# Patient Record
Sex: MALE | Race: WHITE | NOT HISPANIC OR LATINO | Employment: OTHER | URBAN - METROPOLITAN AREA
[De-identification: names, ages, dates, MRNs, and addresses within clinical notes are randomized per-mention and may not be internally consistent; named-entity substitution may affect disease eponyms.]

---

## 2017-10-28 LAB — HBA1C MFR BLD HPLC: 4.9 %

## 2018-07-11 DIAGNOSIS — E03.8 HYPOTHYROIDISM DUE TO HASHIMOTO'S THYROIDITIS: Primary | ICD-10-CM

## 2018-07-11 DIAGNOSIS — E06.3 HYPOTHYROIDISM DUE TO HASHIMOTO'S THYROIDITIS: Primary | ICD-10-CM

## 2018-08-11 LAB — HBA1C MFR BLD HPLC: 5 %

## 2018-08-13 LAB
T4 FREE SERPL-MCNC: 0.92 NG/DL (ref 0.82–1.77)
TSH SERPL DL<=0.005 MIU/L-ACNC: 1.36 UIU/ML (ref 0.45–4.5)

## 2018-08-20 ENCOUNTER — OFFICE VISIT (OUTPATIENT)
Dept: ENDOCRINOLOGY | Facility: HOSPITAL | Age: 33
End: 2018-08-20
Payer: COMMERCIAL

## 2018-08-20 VITALS
WEIGHT: 152.6 LBS | BODY MASS INDEX: 28.08 KG/M2 | DIASTOLIC BLOOD PRESSURE: 86 MMHG | HEART RATE: 76 BPM | HEIGHT: 62 IN | SYSTOLIC BLOOD PRESSURE: 122 MMHG

## 2018-08-20 DIAGNOSIS — E06.3 HYPOTHYROIDISM DUE TO HASHIMOTO'S THYROIDITIS: Primary | ICD-10-CM

## 2018-08-20 DIAGNOSIS — E03.8 HYPOTHYROIDISM DUE TO HASHIMOTO'S THYROIDITIS: Primary | ICD-10-CM

## 2018-08-20 PROCEDURE — 99203 OFFICE O/P NEW LOW 30 MIN: CPT | Performed by: INTERNAL MEDICINE

## 2018-08-20 RX ORDER — DIPHENHYDRAMINE HCL 25 MG
25 CAPSULE ORAL EVERY 12 HOURS PRN
COMMUNITY
End: 2019-08-28 | Stop reason: ALTCHOICE

## 2018-08-20 RX ORDER — QUETIAPINE FUMARATE 50 MG/1
50 TABLET, FILM COATED ORAL
COMMUNITY

## 2018-08-20 RX ORDER — LEVOTHYROXINE SODIUM 0.05 MG/1
50 TABLET ORAL DAILY
COMMUNITY
End: 2019-02-26 | Stop reason: SDUPTHER

## 2018-08-20 RX ORDER — AMLODIPINE BESYLATE 5 MG/1
5 TABLET ORAL DAILY
COMMUNITY

## 2018-08-20 RX ORDER — OXCARBAZEPINE 600 MG/1
600 TABLET, FILM COATED ORAL EVERY 12 HOURS SCHEDULED
COMMUNITY

## 2018-08-20 RX ORDER — CLINDAMYCIN PHOSPHATE 10 MG/G
GEL TOPICAL 2 TIMES DAILY
COMMUNITY
End: 2019-08-28 | Stop reason: ALTCHOICE

## 2018-08-20 RX ORDER — CHLORAL HYDRATE 500 MG
1000 CAPSULE ORAL DAILY
COMMUNITY

## 2018-08-20 RX ORDER — QUETIAPINE FUMARATE 100 MG/1
100 TABLET, FILM COATED ORAL 2 TIMES DAILY
COMMUNITY

## 2018-08-20 NOTE — PROGRESS NOTES
8/20/2018    Assessment/Plan      Diagnoses and all orders for this visit:    Hypothyroidism due to Hashimoto's thyroiditis  -     TSH, 3rd generation Lab Collect; Future  -     T4, free Lab Collect; Future    Other orders  -     amLODIPine (NORVASC) 5 mg tablet; Take 5 mg by mouth daily  -     Omega-3 Fatty Acids (FISH OIL) 1,000 mg; Take 1,000 mg by mouth daily  -     Discontinue: Multiple Vitamin (MULTI-VITAMIN DAILY PO); Take by mouth  -     clindamycin (CLINDAGEL) 1 % gel; Apply topically 2 (two) times a day prn   -     diphenhydrAMINE (BENADRYL) 25 mg capsule; Take 25 mg by mouth every 12 (twelve) hours as needed for itching    -     OXcarbazepine (TRILEPTAL) 600 mg tablet; Take 600 mg by mouth every 12 (twelve) hours    -     QUEtiapine (SEROquel) 100 mg tablet; Take 100 mg by mouth 2 (two) times a day At 2 pm and 8 pm   -     QUEtiapine (SEROquel) 50 mg tablet; Take 50 mg by mouth daily in the early morning    -     Pediatric Multivit-Minerals-C (FLINTSTONES COMPLETE PO); Take 1 each by mouth daily  -     levothyroxine 50 mcg tablet; Take 50 mcg by mouth daily        Assessment/Plan:  Hypothyroidism due to Hashimoto's thyroiditis  His most recent thyroid function tests are normal  He will continue the same levothyroxine 50 mcg daily  Given his history of fluctuation in thyroid levels, I will see him in 6 months  I have asked him to follow up in 6 months with preceding TSH and free T4       CC: thyroid consult    History of Present Illness     HPI: Julio C Carcamo is a 29y o  year old male with history of hypothyroidism due to Hashimoto's thyroiditis  In 2017, he had had a lot of thyroid blood work fluctuations requiring dosage adjustments frequently  His mother brought him to an endocrinologist for evaluation and he was found to have Hashimoto's thyroiditis as the cause of his hypothyroidism  Levothyroxine dosage was set at 50 mcg daily and has been there ever since    Can't he is not the best historian and answers yes to every question so his history is obtained primarily via notes and the group a home aide with him  There is no obvious heat or cold intolerance  There is no obvious palpitation  There is no diarrhea or constipation  There is no tremors, anxiety, depression, insomnia, or fatigue  He will sometimes take afternoon naps  His behaviors are pretty stable for him although he can get more rich at date Program   There is no dry skin or brittle nails  He has a history of alopecia areata and his hair has not grown back in  He has no difficulties with swallowing and appetite is good  Weight has been relatively stable  Review of Systems   Constitutional: Negative for appetite change, fatigue and unexpected weight change  Some afternoon naps  HENT: Positive for hearing loss  Negative for trouble swallowing  Has tympanic tubes  Eyes: Negative for visual disturbance  Respiratory: Negative for chest tightness and shortness of breath  Cardiovascular: Negative for chest pain, palpitations and leg swelling  Gastrointestinal: Negative for abdominal pain, constipation, diarrhea and nausea  Endocrine: Negative for cold intolerance and heat intolerance  Musculoskeletal: Negative for arthralgias and back pain  Skin: Negative for wound  No hair growing in  No dry skin, brittle nails  Neurological: Negative for dizziness, tremors, light-headedness and headaches  Psychiatric/Behavioral: Negative for dysphoric mood and sleep disturbance  The patient is not nervous/anxious  Can have mood swings at day program but mostly good behavior  Goes home on weekends  Lives in a group home         Historical Information   Past Medical History:   Diagnosis Date    Alopecia areata totalis     [facility]     Down syndrome     Heart murmur     Hypertriglyceridemia     Mood disorder ([facility])     Pneumothorax     as a child   Yovany Roles Raynaud's phenomenon     Sensorineural hearing loss     VSD (ventricular septal defect)      Past Surgical History:   Procedure Laterality Date    CRICOID SPLIT      MYRINGOTOMY W/ TUBES Bilateral     TONSILLECTOMY       Social History   History   Alcohol Use No     History   Drug Use No     History   Smoking Status    Never Smoker   Smokeless Tobacco    Never Used     Family History:   Family History   Problem Relation Age of Onset    Sleep apnea Mother     Heart attack Mother     Hyperlipidemia Mother     Raynaud syndrome Mother     No Known Problems Father     No Known Problems Sister     No Known Problems Brother     Diabetes type II Family     Parkinsonism Family        Meds/Allergies   Current Outpatient Prescriptions   Medication Sig Dispense Refill    amLODIPine (NORVASC) 5 mg tablet Take 5 mg by mouth daily      clindamycin (CLINDAGEL) 1 % gel Apply topically 2 (two) times a day prn       diphenhydrAMINE (BENADRYL) 25 mg capsule Take 25 mg by mouth every 12 (twelve) hours as needed for itching        levothyroxine 50 mcg tablet Take 50 mcg by mouth daily      Omega-3 Fatty Acids (FISH OIL) 1,000 mg Take 1,000 mg by mouth daily      OXcarbazepine (TRILEPTAL) 600 mg tablet Take 600 mg by mouth every 12 (twelve) hours        Pediatric Multivit-Minerals-C (FLINTSTONES COMPLETE PO) Take 1 each by mouth daily      QUEtiapine (SEROquel) 100 mg tablet Take 100 mg by mouth 2 (two) times a day At 2 pm and 8 pm       QUEtiapine (SEROquel) 50 mg tablet Take 50 mg by mouth daily in the early morning         No current facility-administered medications for this visit  Allergies   Allergen Reactions    Debrox [Carbamide Peroxide]        Objective   Vitals: Blood pressure 122/86, pulse 76, height 5' 2" (1 575 m), weight 69 2 kg (152 lb 9 6 oz)    Invasive Devices          No matching active lines, drains, or airways          Physical Exam   Constitutional: He is oriented to person, place, and time  He appears well-developed and well-nourished  HENT:   Head: Normocephalic and atraumatic  Eyes: Conjunctivae and EOM are normal  Pupils are equal, round, and reactive to light  No obvious lid lag, stare, proptosis, or periorbital edema  Neck: Normal range of motion  Neck supple  No thyromegaly present  No thyroid nodules palpable  No bruits over the carotids or thyroid gland  Cardiovascular: Normal rate, regular rhythm, normal heart sounds and intact distal pulses  No murmur heard  Pulmonary/Chest: Effort normal and breath sounds normal  He has no wheezes  Abdominal: Soft  Bowel sounds are normal  There is no tenderness  Musculoskeletal: Normal range of motion  He exhibits no edema or deformity  No tremor of the outstretched hands  No spinous process tenderness  No CVA tenderness  Lymphadenopathy:     He has no cervical adenopathy  Neurological: He is alert and oriented to person, place, and time  He has normal reflexes  Skin: Skin is warm and dry  No rash noted  No erythema  Vitals reviewed  The history was obtained from the review of the chart and from the patient and group home aide  Lab Results: Additional blood work done on 08/11/2018 at [facility] did show a CBC that was normal   CMP showed a glucose of 94 fasting but was otherwise normal   Hemoglobin A1c was 5%  Total cholesterol 219, triglycerides 371, HDL 47, and LDL 98      Lab Results   Component Value Date    FREET4 0 92 08/11/2018       Recent Results (from the past 68567 hour(s))   T4, free    Collection Time: 08/11/18  9:23 AM   Result Value Ref Range    Free t4 0 92 0 82 - 1 77 ng/dL   TSH, 3rd generation    Collection Time: 08/11/18  9:23 AM   Result Value Ref Range    TSH 1 360 0 450 - 4 500 uIU/mL         Future Appointments  Date Time Provider Clayton Rivera   2/26/2019 1:00 PM Jung Cameron MD [facility]

## 2018-08-20 NOTE — PATIENT INSTRUCTIONS
Thyroid blood work is normal   Continue the same levothyroxine 50 mcg daily  Follow up in 6 months with TSH and free T4

## 2018-08-20 NOTE — LETTER
August 20, 2018     Lonnie Carrera MD  [address] [place] [address]    Patient: Theo Tavarez   YOB: 1985   Date of Visit: 8/20/2018       Dear Dr Law Brochure: Thank you for referring Theo Tavarez to me for evaluation  Below are my notes for this consultation  If you have questions, please do not hesitate to call me  I look forward to following your patient along with you  Sincerely,        Santiago Mills MD        CC: No Recipients  Santiago Mills MD  8/20/2018  1:41 PM  Sign at close encounter  8/20/2018    Assessment/Plan      Diagnoses and all orders for this visit:    Hypothyroidism due to Hashimoto's thyroiditis  -     TSH, 3rd generation Lab Collect; Future  -     T4, free Lab Collect; Future    Other orders  -     amLODIPine (NORVASC) 5 mg tablet; Take 5 mg by mouth daily  -     Omega-3 Fatty Acids (FISH OIL) 1,000 mg; Take 1,000 mg by mouth daily  -     Discontinue: Multiple Vitamin (MULTI-VITAMIN DAILY PO); Take by mouth  -     clindamycin (CLINDAGEL) 1 % gel; Apply topically 2 (two) times a day prn   -     diphenhydrAMINE (BENADRYL) 25 mg capsule; Take 25 mg by mouth every 12 (twelve) hours as needed for itching    -     OXcarbazepine (TRILEPTAL) 600 mg tablet; Take 600 mg by mouth every 12 (twelve) hours    -     QUEtiapine (SEROquel) 100 mg tablet; Take 100 mg by mouth 2 (two) times a day At 2 pm and 8 pm   -     QUEtiapine (SEROquel) 50 mg tablet; Take 50 mg by mouth daily in the early morning    -     Pediatric Multivit-Minerals-C (FLINTSTONES COMPLETE PO); Take 1 each by mouth daily  -     levothyroxine 50 mcg tablet; Take 50 mcg by mouth daily        Assessment/Plan:  Hypothyroidism due to Hashimoto's thyroiditis  His most recent thyroid function tests are normal  He will continue the same levothyroxine 50 mcg daily  Given his history of fluctuation in thyroid levels, I will see him in 6 months    I have asked him to follow up in 6 months with preceding TSH and free T4       CC: thyroid consult    History of Present Illness     HPI: Lalo To is a 29y o  year old male with history of hypothyroidism due to Hashimoto's thyroiditis  In 2017, he had had a lot of thyroid blood work fluctuations requiring dosage adjustments frequently  His mother brought him to an endocrinologist for evaluation and he was found to have Hashimoto's thyroiditis as the cause of his hypothyroidism  Levothyroxine dosage was set at 50 mcg daily and has been there ever since  Can't he is not the best historian and answers yes to every question so his history is obtained primarily via notes and the group a home aide with him  There is no obvious heat or cold intolerance  There is no obvious palpitation  There is no diarrhea or constipation  There is no tremors, anxiety, depression, insomnia, or fatigue  He will sometimes take afternoon naps  His behaviors are pretty stable for him although he can get more rich at date Program   There is no dry skin or brittle nails  He has a history of alopecia areata and his hair has not grown back in  He has no difficulties with swallowing and appetite is good  Weight has been relatively stable  Review of Systems   Constitutional: Negative for appetite change, fatigue and unexpected weight change  Some afternoon naps  HENT: Positive for hearing loss  Negative for trouble swallowing  Has tympanic tubes  Eyes: Negative for visual disturbance  Respiratory: Negative for chest tightness and shortness of breath  Cardiovascular: Negative for chest pain, palpitations and leg swelling  Gastrointestinal: Negative for abdominal pain, constipation, diarrhea and nausea  Endocrine: Negative for cold intolerance and heat intolerance  Musculoskeletal: Negative for arthralgias and back pain  Skin: Negative for wound  No hair growing in  No dry skin, brittle nails     Neurological: Negative for dizziness, tremors, light-headedness and headaches  Psychiatric/Behavioral: Negative for dysphoric mood and sleep disturbance  The patient is not nervous/anxious  Can have mood swings at day program but mostly good behavior  Goes home on weekends  Lives in a group home         Historical Information   Past Medical History:   Diagnosis Date    Alopecia areata totalis     [facility] [address] [facility]     Down syndrome     Heart murmur     Hypertriglyceridemia     Mood disorder ([facility])     Pneumothorax     as a child    Raynaud's phenomenon     Sensorineural hearing loss     VSD (ventricular septal defect)      Past Surgical History:   Procedure Laterality Date    CRICOID SPLIT      MYRINGOTOMY W/ TUBES Bilateral     TONSILLECTOMY       Social History   History   Alcohol Use No     History   Drug Use No     History   Smoking Status    Never Smoker   Smokeless Tobacco    Never Used     Family History:   Family History   Problem Relation Age of Onset    Sleep apnea Mother     Heart attack Mother     Hyperlipidemia Mother     Raynaud syndrome Mother     No Known Problems Father     No Known Problems Sister     No Known Problems Brother     Diabetes type II Family     Parkinsonism Family        Meds/Allergies   Current Outpatient Prescriptions   Medication Sig Dispense Refill    amLODIPine (NORVASC) 5 mg tablet Take 5 mg by mouth daily      clindamycin (CLINDAGEL) 1 % gel Apply topically 2 (two) times a day prn       diphenhydrAMINE (BENADRYL) 25 mg capsule Take 25 mg by mouth every 12 (twelve) hours as needed for itching        levothyroxine 50 mcg tablet Take 50 mcg by mouth daily      Omega-3 Fatty Acids (FISH OIL) 1,000 mg Take 1,000 mg by mouth daily      OXcarbazepine (TRILEPTAL) 600 mg tablet Take 600 mg by mouth every 12 (twelve) hours        Pediatric Multivit-Minerals-C (FLINTSTONES COMPLETE PO) Take 1 each by mouth daily      QUEtiapine (SEROquel) 100 mg tablet Take 100 mg by mouth 2 (two) times a day At 2 pm and 8 pm       QUEtiapine (SEROquel) 50 mg tablet Take 50 mg by mouth daily in the early morning         No current facility-administered medications for this visit  Allergies   Allergen Reactions    Debrox [Carbamide Peroxide]        Objective   Vitals: Blood pressure 122/86, pulse 76, height 5' 2" (1 575 m), weight 69 2 kg (152 lb 9 6 oz)  Invasive Devices          No matching active lines, drains, or airways          Physical Exam   Constitutional: He is oriented to person, place, and time  He appears well-developed and well-nourished  HENT:   Head: Normocephalic and atraumatic  Eyes: Conjunctivae and EOM are normal  Pupils are equal, round, and reactive to light  No obvious lid lag, stare, proptosis, or periorbital edema  Neck: Normal range of motion  Neck supple  No thyromegaly present  No thyroid nodules palpable  No bruits over the carotids or thyroid gland  Cardiovascular: Normal rate, regular rhythm, normal heart sounds and intact distal pulses  No murmur heard  Pulmonary/Chest: Effort normal and breath sounds normal  He has no wheezes  Abdominal: Soft  Bowel sounds are normal  There is no tenderness  Musculoskeletal: Normal range of motion  He exhibits no edema or deformity  No tremor of the outstretched hands  No spinous process tenderness  No CVA tenderness  Lymphadenopathy:     He has no cervical adenopathy  Neurological: He is alert and oriented to person, place, and time  He has normal reflexes  Skin: Skin is warm and dry  No rash noted  No erythema  Vitals reviewed  The history was obtained from the review of the chart and from the patient and group home aide  Lab Results: Additional blood work done on 08/11/2018 [facility] did show a CBC that was normal   CMP showed a glucose of 94 fasting but was otherwise normal   Hemoglobin A1c was 5%    Total cholesterol 219, triglycerides 371, HDL 47, and LDL 98     Lab Results   Component Value Date    FREET4 0 92 08/11/2018       Recent Results (from the past 76095 hour(s))   T4, free    Collection Time: 08/11/18  9:23 AM   Result Value Ref Range    Free t4 0 92 0 82 - 1 77 ng/dL   TSH, 3rd generation    Collection Time: 08/11/18  9:23 AM   Result Value Ref Range    TSH 1 360 0 450 - 4 500 uIU/mL         Future Appointments  Date Time Provider [facility]   2/26/2019 1:00 PM Sushant Lentz MD [facility]

## 2019-02-18 LAB
T4 FREE SERPL-MCNC: 0.96 NG/DL (ref 0.82–1.77)
TSH SERPL DL<=0.005 MIU/L-ACNC: 4.98 UIU/ML (ref 0.45–4.5)

## 2019-02-26 ENCOUNTER — OFFICE VISIT (OUTPATIENT)
Dept: ENDOCRINOLOGY | Facility: HOSPITAL | Age: 34
End: 2019-02-26
Payer: COMMERCIAL

## 2019-02-26 VITALS
HEIGHT: 62 IN | HEART RATE: 76 BPM | SYSTOLIC BLOOD PRESSURE: 130 MMHG | BODY MASS INDEX: 28.37 KG/M2 | WEIGHT: 154.2 LBS | DIASTOLIC BLOOD PRESSURE: 84 MMHG

## 2019-02-26 DIAGNOSIS — E66.3 OVERWEIGHT: ICD-10-CM

## 2019-02-26 DIAGNOSIS — E03.8 HYPOTHYROIDISM DUE TO HASHIMOTO'S THYROIDITIS: Primary | ICD-10-CM

## 2019-02-26 DIAGNOSIS — E06.3 HYPOTHYROIDISM DUE TO HASHIMOTO'S THYROIDITIS: Primary | ICD-10-CM

## 2019-02-26 PROCEDURE — 99213 OFFICE O/P EST LOW 20 MIN: CPT | Performed by: INTERNAL MEDICINE

## 2019-02-26 RX ORDER — LEVOTHYROXINE SODIUM 0.05 MG/1
TABLET ORAL
Qty: 40 TABLET | Refills: 11 | Status: SHIPPED | OUTPATIENT
Start: 2019-02-26

## 2019-02-26 NOTE — PROGRESS NOTES
2/26/2019    Assessment/Plan      Diagnoses and all orders for this visit:    Hypothyroidism due to Hashimoto's thyroiditis  -     TSH, 3rd generation Lab Collect; Future  -     T4, free Lab Collect; Future  -     TSH, 3rd generation Lab Collect; Future  -     T4, free Lab Collect; Future  -     levothyroxine 50 mcg tablet; 1 tablet 4 days a week and 1 5 tablets 3 days a week on Monday, Wednesday, and friday    Overweight        Assessment/Plan:  1  Hypothyroidism due to Hashimoto's thyroiditis  His most recent blood work did show that his TSH was elevated demonstrating hypothyroidism  I will increase his levothyroxine to 50 mcg 1 5 tablets on Monday Wednesday and Friday and 1 tablet 4 days a week  I will have him repeat his TSH with free T4 in about 3 months  I will then have him follow up in 6 months with preceding TSH and free T4   2  Overweight  I discussed with his mother the fact that he has started to increase his weight in his abdomen  His weight is only 2 lb more than the summer  The group home aide accompanying him reports that he eats mostly carbohydrates and junk food and is very picky  I encouraged them to help him to eat healthy as able  I did discuss the above changes with his mother prior to the appointment by phone  CC:  Hypothyroid follow-up    History of Present Illness     HPI: Mariana Hannon is a 34y o  year old male with history of hypothyroidism due to Hashimoto's thyroiditis  He has hypothyroidism with a lot of fluctuations in blood work in 2017  Dosage of thyroid hormone has been adjusted  He is currently on levothyroxine 50 mcg daily  He has increased behaviors at workshop which is not new  Behaviors good at home  He has had a 2 lb weight gain but abdominal girth is increasing  He is fatigued  He has no insomnia, diarrhea or constipation, palpitation, tremors, heat or cold intolerance  He continues to have alopecia with no hair growth    He has some hand dry skin but no brittle nails  He has no diplopia  He has no troubles with swallowing  Review of Systems   Constitutional: Positive for fatigue and unexpected weight change  2 lb weight gain since august 2018  Abdominal girth increasing  Aide with him reports he only wants to eat junk food, pasta and chicken nuggets, pizza, bagel and bread  He is reportedly a very picky eater  HENT: Negative for trouble swallowing  Eyes: Negative for visual disturbance  No diplopia  Respiratory: Negative for chest tightness and shortness of breath  Cardiovascular: Negative for chest pain and palpitations  Gastrointestinal: Negative for abdominal pain, constipation, diarrhea and nausea  Endocrine: Negative for cold intolerance and heat intolerance  Skin: Negative for rash  Some hand dry skin  No brittle nails  No change in hair  growth, lack of  Neurological: Negative for tremors and headaches  Psychiatric/Behavioral: Negative for dysphoric mood and sleep disturbance  The patient is not nervous/anxious          Historical Information   Past Medical History:   Diagnosis Date    Alopecia areata totalis     [facility]     Down syndrome     Heart murmur     Hypertriglyceridemia     Mood disorder ([facility])     Pneumothorax     as a child    Raynaud's phenomenon     Sensorineural hearing loss     VSD (ventricular septal defect)      Past Surgical History:   Procedure Laterality Date    CRICOID SPLIT      MYRINGOTOMY W/ TUBES Bilateral     TONSILLECTOMY       Social History   Social History     Substance and Sexual Activity   Alcohol Use No     Social History     Substance and Sexual Activity   Drug Use No     Social History     Tobacco Use   Smoking Status Never Smoker   Smokeless Tobacco Never Used     Family History:   Family History   Problem Relation Age of Onset    Sleep apnea Mother     Heart attack Mother     Hyperlipidemia Mother     Raynaud syndrome Mother     No Known Problems Father     No Known Problems Sister     No Known Problems Brother     Diabetes type II Family     Parkinsonism Family        Meds/Allergies   Current Outpatient Medications   Medication Sig Dispense Refill    amLODIPine (NORVASC) 5 mg tablet Take 5 mg by mouth daily      clindamycin (CLINDAGEL) 1 % gel Apply topically 2 (two) times a day prn       levothyroxine 50 mcg tablet 1 tablet 4 days a week and 1 5 tablets 3 days a week on Monday, Wednesday, and friday 40 tablet 11    Omega-3 Fatty Acids (FISH OIL) 1,000 mg Take 1,000 mg by mouth daily      OXcarbazepine (TRILEPTAL) 600 mg tablet Take 600 mg by mouth every 12 (twelve) hours        Pediatric Multivit-Minerals-C (FLINTSTONES COMPLETE PO) Take 1 each by mouth daily      QUEtiapine (SEROquel) 100 mg tablet Take 100 mg by mouth 2 (two) times a day At 2 pm and 8 pm       QUEtiapine (SEROquel) 50 mg tablet Take 50 mg by mouth daily in the early morning        diphenhydrAMINE (BENADRYL) 25 mg capsule Take 25 mg by mouth every 12 (twelve) hours as needed for itching         No current facility-administered medications for this visit  Allergies   Allergen Reactions    Debrox [Carbamide Peroxide]        Objective   Vitals: Blood pressure 130/84, pulse 76, height 5' 2" (1 575 m), weight 69 9 kg (154 lb 3 2 oz)  Invasive Devices          None          Physical Exam   Constitutional: He is oriented to person, place, and time  He appears well-developed and well-nourished  He was cooperative with the exam    HENT:   Head: Normocephalic and atraumatic  Eyes: Pupils are equal, round, and reactive to light  Conjunctivae and EOM are normal    No obvious lid lag, stare, proptosis, or periorbital edema  Neck: Normal range of motion  Neck supple  No thyromegaly present  Thyroid normal in size without palpable thyroid nodules  Cardiovascular: Normal rate, regular rhythm and normal heart sounds     No murmur heard   Pulmonary/Chest: Effort normal and breath sounds normal  He has no wheezes  Abdominal: Soft  There is no tenderness  Musculoskeletal: Normal range of motion  He exhibits no edema or deformity  No tremor of the outstretched hands  Lymphadenopathy:     He has no cervical adenopathy  Neurological: He is alert and oriented to person, place, and time  He has normal reflexes  Deep tendon reflexes normal without briskness  Skin: Skin is warm and dry  No rash noted  No erythema  Vitals reviewed  The history was obtained from the review of the chart and from the patient and group home aide      Lab Results:     Lab Results   Component Value Date    TSH 4 980 (H) 02/16/2019    FREET4 0 96 02/16/2019         Future Appointments   Date Time Provider Clayton Rivera   8/28/2019 10:20 AM Rupert Dela Cruz MD [facility]

## 2019-02-26 NOTE — PATIENT INSTRUCTIONS
thyroid blood work is a little underactive  Increase the levothyroxine to 50 mcg 1 5 tablets 3 days a week and 1 tablet 4 days a week  We'll repeat the thyroid blood work in 3 months  Keep working on healthy eating habits for Loo  Follow up in 6 months with blood work

## 2019-02-26 NOTE — LETTER
February 26, 2019     Rasheed Oscar MD  [address] [facility]    Patient: Philomena Xiong   YOB: 1985   Date of Visit: 2/26/2019       Dear Dr Presley Florez: Thank you for referring Philomena Xiong to me for evaluation  Below are my notes for this consultation  If you have questions, please do not hesitate to call me  I look forward to following your patient along with you  Sincerely,        Myrna Hidalgo MD        CC: No Recipients  Myrna Hidalgo MD  2/26/2019  2:19 PM  Sign at close encounter  2/26/2019    Assessment/Plan      Diagnoses and all orders for this visit:    Hypothyroidism due to Hashimoto's thyroiditis  -     TSH, 3rd generation Lab Collect; Future  -     T4, free Lab Collect; Future  -     TSH, 3rd generation Lab Collect; Future  -     T4, free Lab Collect; Future  -     levothyroxine 50 mcg tablet; 1 tablet 4 days a week and 1 5 tablets 3 days a week on Monday, Wednesday, and friday    Overweight        Assessment/Plan:  1  Hypothyroidism due to Hashimoto's thyroiditis  His most recent blood work did show that his TSH was elevated demonstrating hypothyroidism  I will increase his levothyroxine to 50 mcg 1 5 tablets on Monday Wednesday and Friday and 1 tablet 4 days a week  I will have him repeat his TSH with free T4 in about 3 months  I will then have him follow up in 6 months with preceding TSH and free T4   2  Overweight  I discussed with his mother the fact that he has started to increase his weight in his abdomen  His weight is only 2 lb more than the summer  The group home aide accompanying him reports that he eats mostly carbohydrates and junk food and is very picky  I encouraged them to help him to eat healthy as able  I did discuss the above changes with his mother prior to the appointment by phone          CC:  Hypothyroid follow-up    History of Present Illness     HPI: Philomena Xiong is a 34y o  year old male with history of hypothyroidism due to Hashimoto's thyroiditis  He has hypothyroidism with a lot of fluctuations in blood work in 2017  Dosage of thyroid hormone has been adjusted  He is currently on levothyroxine 50 mcg daily  He has increased behaviors at workshop which is not new  Behaviors good at home  He has had a 2 lb weight gain but abdominal girth is increasing  He is fatigued  He has no insomnia, diarrhea or constipation, palpitation, tremors, heat or cold intolerance  He continues to have alopecia with no hair growth  He has some hand dry skin but no brittle nails  He has no diplopia  He has no troubles with swallowing  Review of Systems   Constitutional: Positive for fatigue and unexpected weight change  2 lb weight gain since august 2018  Abdominal girth increasing  Aide with him reports he only wants to eat junk food, pasta and chicken nuggets, pizza, bagel and bread  He is reportedly a very picky eater  HENT: Negative for trouble swallowing  Eyes: Negative for visual disturbance  No diplopia  Respiratory: Negative for chest tightness and shortness of breath  Cardiovascular: Negative for chest pain and palpitations  Gastrointestinal: Negative for abdominal pain, constipation, diarrhea and nausea  Endocrine: Negative for cold intolerance and heat intolerance  Skin: Negative for rash  Some hand dry skin  No brittle nails  No change in hair  growth, lack of  Neurological: Negative for tremors and headaches  Psychiatric/Behavioral: Negative for dysphoric mood and sleep disturbance  The patient is not nervous/anxious          Historical Information   Past Medical History:   Diagnosis Date    Alopecia areata totalis     [facility]     Down syndrome     Heart murmur     Hypertriglyceridemia     Mood disorder ([facility])     Pneumothorax     as a child    Raynaud's phenomenon     Sensorineural hearing loss     VSD (ventricular septal defect)      Past Surgical History:   Procedure Laterality Date    CRICOID SPLIT      MYRINGOTOMY W/ TUBES Bilateral     TONSILLECTOMY       Social History   Social History     Substance and Sexual Activity   Alcohol Use No     Social History     Substance and Sexual Activity   Drug Use No     Social History     Tobacco Use   Smoking Status Never Smoker   Smokeless Tobacco Never Used     Family History:   Family History   Problem Relation Age of Onset    Sleep apnea Mother     Heart attack Mother     Hyperlipidemia Mother     Raynaud syndrome Mother     No Known Problems Father     No Known Problems Sister     No Known Problems Brother     Diabetes type II Family     Parkinsonism Family        Meds/Allergies   Current Outpatient Medications   Medication Sig Dispense Refill    amLODIPine (NORVASC) 5 mg tablet Take 5 mg by mouth daily      clindamycin (CLINDAGEL) 1 % gel Apply topically 2 (two) times a day prn       levothyroxine 50 mcg tablet 1 tablet 4 days a week and 1 5 tablets 3 days a week on Monday, Wednesday, and friday 40 tablet 11    Omega-3 Fatty Acids (FISH OIL) 1,000 mg Take 1,000 mg by mouth daily      OXcarbazepine (TRILEPTAL) 600 mg tablet Take 600 mg by mouth every 12 (twelve) hours        Pediatric Multivit-Minerals-C (FLINTSTONES COMPLETE PO) Take 1 each by mouth daily      QUEtiapine (SEROquel) 100 mg tablet Take 100 mg by mouth 2 (two) times a day At 2 pm and 8 pm       QUEtiapine (SEROquel) 50 mg tablet Take 50 mg by mouth daily in the early morning        diphenhydrAMINE (BENADRYL) 25 mg capsule Take 25 mg by mouth every 12 (twelve) hours as needed for itching         No current facility-administered medications for this visit  Allergies   Allergen Reactions    Debrox [Carbamide Peroxide]        Objective   Vitals: Blood pressure 130/84, pulse 76, height 5' 2" (1 575 m), weight 69 9 kg (154 lb 3 2 oz)    Invasive Devices          None Physical Exam   Constitutional: He is oriented to person, place, and time  He appears well-developed and well-nourished  He was cooperative with the exam    HENT:   Head: Normocephalic and atraumatic  Eyes: Pupils are equal, round, and reactive to light  Conjunctivae and EOM are normal    No obvious lid lag, stare, proptosis, or periorbital edema  Neck: Normal range of motion  Neck supple  No thyromegaly present  Thyroid normal in size without palpable thyroid nodules  Cardiovascular: Normal rate, regular rhythm and normal heart sounds  No murmur heard  Pulmonary/Chest: Effort normal and breath sounds normal  He has no wheezes  Abdominal: Soft  There is no tenderness  Musculoskeletal: Normal range of motion  He exhibits no edema or deformity  No tremor of the outstretched hands  Lymphadenopathy:     He has no cervical adenopathy  Neurological: He is alert and oriented to person, place, and time  He has normal reflexes  Deep tendon reflexes normal without briskness  Skin: Skin is warm and dry  No rash noted  No erythema  Vitals reviewed  The history was obtained from the review of the chart and from the patient and group home aide      Lab Results:     Lab Results   Component Value Date    TSH 4 980 (H) 02/16/2019    FREET4 0 96 02/16/2019         Future Appointments   Date Time Provider Clayton Rivera   8/28/2019 10:20 AM Makenzie Huizar MD [facility]

## 2019-05-26 LAB
T4 FREE SERPL-MCNC: 0.88 NG/DL (ref 0.82–1.77)
TSH SERPL DL<=0.005 MIU/L-ACNC: 3.87 UIU/ML (ref 0.45–4.5)

## 2019-08-28 ENCOUNTER — OFFICE VISIT (OUTPATIENT)
Dept: ENDOCRINOLOGY | Facility: HOSPITAL | Age: 34
End: 2019-08-28
Payer: COMMERCIAL

## 2019-08-28 VITALS
HEIGHT: 62 IN | WEIGHT: 160.6 LBS | SYSTOLIC BLOOD PRESSURE: 122 MMHG | HEART RATE: 66 BPM | BODY MASS INDEX: 29.55 KG/M2 | DIASTOLIC BLOOD PRESSURE: 84 MMHG

## 2019-08-28 DIAGNOSIS — E03.8 HYPOTHYROIDISM DUE TO HASHIMOTO'S THYROIDITIS: Primary | ICD-10-CM

## 2019-08-28 DIAGNOSIS — E06.3 HYPOTHYROIDISM DUE TO HASHIMOTO'S THYROIDITIS: Primary | ICD-10-CM

## 2019-08-28 PROCEDURE — 99213 OFFICE O/P EST LOW 20 MIN: CPT | Performed by: INTERNAL MEDICINE

## 2019-08-28 RX ORDER — LEVOTHYROXINE SODIUM 0.07 MG/1
75 TABLET ORAL 3 TIMES WEEKLY
COMMUNITY

## 2019-08-28 NOTE — PATIENT INSTRUCTIONS
The thyroid blood work is ok  No change in levothyroxine dosage  Follow up in 6 months with blood work

## 2019-08-28 NOTE — PROGRESS NOTES
8/28/2019    Assessment/Plan      Diagnoses and all orders for this visit:    Hypothyroidism due to Hashimoto's thyroiditis  -     TSH, 3rd generation Lab Collect; Future  -     T4, free Lab Collect; Future    Other orders  -     levothyroxine 75 mcg tablet; Take 75 mcg by mouth daily Monday, Wednesday, friday        Assessment/Plan:    Hypothyroidism due to Hashimoto's thyroiditis  His most recent thyroid function tests do show a normal TSH with a slightly low free T4  The free T4 is likely low as result of Seroquel affects on the T4 assay  As his TSH is normal, he is biochemically euthyroid  I will continue the same levothyroxine 75 mcg 3 days a week and 50 mcg 4 days a week for now  I have asked him to follow up in 6 months with preceding TSH and free T4       CC:  Hypothyroid follow-up    History of Present Illness     HPI: Vida Apodaca is a 34y o  year old male with history of  Hypothyroidism due to Hashimoto's thyroiditis here for follow-up  He has hypothyroidism and a lot of fluctuations in his blood work since 2017  Dosage of thyroid hormone has needed to be adjusted over time  He does have a history of alopecia totalis  He takes levothyroxine 50 mcg 4 days a week and 75 mcg 3 days a week  He has no heat or cold intolerance, diarrhea or constipation, anxiety or depression, or tremors  He has no insomnia or fatigue  The  notes that his behaviors are stable  He has dry skin but no brittle nails  He has continued hair loss with no new hair growth  He has no diplopia or visual changes  Weight is now 6 lb more than February 2019  He is still eating a lot of carbs, very picky eater  There is no obvious compressive thyroid symptoms or difficulties with swallowing  Review of Systems   Constitutional: Positive for unexpected weight change  Negative for fatigue  Weight 6 lb more than February 2019  HENT: Negative for trouble swallowing      Eyes: Negative for visual disturbance  Has glasses, refuses to wear them  Respiratory: Negative for chest tightness and shortness of breath  Cardiovascular: Negative for chest pain and palpitations  Gastrointestinal: Negative for abdominal pain, constipation, diarrhea and nausea  Endocrine: Negative for cold intolerance and heat intolerance  Skin: Negative for rash  No dry skin  No brittle nails  No hair growth  Neurological: Positive for headaches  Negative for dizziness, tremors and light-headedness  Still occasional headache  Psychiatric/Behavioral: Negative for dysphoric mood and sleep disturbance  The patient is not nervous/anxious  Behaviors are stable  Some increase in behaviors at workshop without change         Historical Information   Past Medical History:   Diagnosis Date    Alopecia areata totalis     [facility])     Down syndrome     Heart murmur     Hypertriglyceridemia     Mood disorder ([facility])     Pneumothorax     as a child    Raynaud's phenomenon     Sensorineural hearing loss     VSD (ventricular septal defect)      Past Surgical History:   Procedure Laterality Date    CRICOID SPLIT      MYRINGOTOMY W/ TUBES Bilateral     TONSILLECTOMY       Social History   Social History     Substance and Sexual Activity   Alcohol Use No     Social History     Substance and Sexual Activity   Drug Use No     Social History     Tobacco Use   Smoking Status Never Smoker   Smokeless Tobacco Never Used     Family History:   Family History   Problem Relation Age of Onset    Sleep apnea Mother     Heart attack Mother     Hyperlipidemia Mother     Raynaud syndrome Mother     No Known Problems Father     No Known Problems Sister     No Known Problems Brother     Diabetes type II Family     Parkinsonism Family        Meds/Allergies   Current Outpatient Medications   Medication Sig Dispense Refill    amLODIPine (NORVASC) 5 mg tablet Take 5 mg by mouth daily      levothyroxine 50 mcg tablet 1 tablet 4 days a week and 1 5 tablets 3 days a week on Monday, Wednesday, and friday (Patient taking differently: Take one tablet 4 days a week, Sunday, Tuesday, Thursday, Saturday) 40 tablet 11    levothyroxine 75 mcg tablet Take 75 mcg by mouth daily Monday, Wednesday, friday      Omega-3 Fatty Acids (FISH OIL) 1,000 mg Take 1,000 mg by mouth daily      OXcarbazepine (TRILEPTAL) 600 mg tablet Take 600 mg by mouth every 12 (twelve) hours        Pediatric Multivit-Minerals-C (FLINTSTONES COMPLETE PO) Take 1 each by mouth daily      QUEtiapine (SEROquel) 100 mg tablet Take 100 mg by mouth 2 (two) times a day At 2 pm and 8 pm       QUEtiapine (SEROquel) 50 mg tablet Take 50 mg by mouth daily in the early morning         No current facility-administered medications for this visit  Allergies   Allergen Reactions    Debrox [Carbamide Peroxide]     Latex     Morphine        Objective   Vitals: Blood pressure 122/84, pulse 66, height 5' 2" (1 575 m), weight 72 8 kg (160 lb 9 6 oz)  Invasive Devices     None                 Physical Exam   Constitutional: He is oriented to person, place, and time  He appears well-developed and well-nourished  HENT:   Head: Normocephalic and atraumatic  Eyes: Pupils are equal, round, and reactive to light  Conjunctivae and EOM are normal      No lid lag, stare, proptosis, or periorbital edema  Neck: Normal range of motion  Neck supple  No thyromegaly present  Thyroid normal in size  No palpable thyroid nodules  No carotid bruits  Cardiovascular: Normal rate, regular rhythm and normal heart sounds  No murmur heard  Pulmonary/Chest: Effort normal and breath sounds normal  He has no wheezes  Musculoskeletal: Normal range of motion  He exhibits no edema or deformity  No tremor of the outstretched hands  Lymphadenopathy:     He has no cervical adenopathy     Neurological: He is alert and oriented to person, place, and time  He has normal reflexes  Deep tendon reflexes normal without briskness  Skin: Skin is warm and dry  No rash noted  No erythema  Has hair loss  Vitals reviewed  The history was obtained from the review of the chart and from the patient and   Lab Results:    Blood work done on   08/24/2019 at [facility] shows a TSH of 2 6 with a free T4 0 67      Future Appointments   Date Time Provider Clayton Rivera   3/3/2020 10:20 AM Ezequiel Gamez MD [facility]

## 2020-02-09 LAB
T4 FREE SERPL-MCNC: 0.81 NG/DL (ref 0.82–1.77)
TSH SERPL DL<=0.005 MIU/L-ACNC: 1.54 UIU/ML (ref 0.45–4.5)

## 2020-03-03 ENCOUNTER — OFFICE VISIT (OUTPATIENT)
Dept: ENDOCRINOLOGY | Facility: HOSPITAL | Age: 35
End: 2020-03-03
Payer: COMMERCIAL

## 2020-03-03 VITALS
BODY MASS INDEX: 30.25 KG/M2 | DIASTOLIC BLOOD PRESSURE: 88 MMHG | HEIGHT: 62 IN | HEART RATE: 72 BPM | SYSTOLIC BLOOD PRESSURE: 122 MMHG | WEIGHT: 164.4 LBS

## 2020-03-03 DIAGNOSIS — E03.8 HYPOTHYROIDISM DUE TO HASHIMOTO'S THYROIDITIS: Primary | ICD-10-CM

## 2020-03-03 DIAGNOSIS — E06.3 HYPOTHYROIDISM DUE TO HASHIMOTO'S THYROIDITIS: Primary | ICD-10-CM

## 2020-03-03 PROCEDURE — 99213 OFFICE O/P EST LOW 20 MIN: CPT | Performed by: INTERNAL MEDICINE

## 2020-03-03 NOTE — PATIENT INSTRUCTIONS
The thyroid blood work is good  The free T4 fluctuates and the TSH is the most important number and is excellent  No change in levothyroxine dosage  Follow up in 6 months with blood work

## 2020-03-03 NOTE — PROGRESS NOTES
3/3/2020    Assessment/Plan      Diagnoses and all orders for this visit:    Hypothyroidism due to Hashimoto's thyroiditis  -     TSH, 3rd generation Lab Collect; Future  -     T4, free Lab Collect; Future        Assessment/Plan:  1  Hypothyroidism due to Hashimoto's thyroiditis  Most recent thyroid function tests do show a normal TSH  His free T4 is slightly below normal   At this point, biochemically, he is euthyroid based on his normal TSH  Clinically he is also euthyroid so I would not adjust his thyroid hormone based on the slightly low free T4  It could be falsely low due to his anti psychiatric medications  For now, he will continue the same levothyroxine 50 mcg 4 days a week and 75 mcg 3 days a week  I have asked him to follow up in 6 months with preceding TSH and free T4       CC:  Hypothyroid follow-up    History of Present Illness     HPI: Ulysses Elizondo is a 30y o  year old male with history of hypothyroidism due to Hashimoto's thyroiditis here for follow-up  He had a lot of blood work fluctuations in 2017 with dosage adjustments in his thyroid hormone and he was brought to an endocrinologist for evaluation at that time  His thyroid hormone dosage was set and he has had slow various adjustments over the years since then but only minor changes  He does have a history of alopecia totalis  He is currently taking levothyroxine 50 mcg 1 tablet 4 days a week on Sunday, Tuesday, Thursday, and Saturday and levothyroxine 75 mcg 1 tablet 3 days a week on Monday, Wednesday, and Friday  Weight is 4 lb more than August 2019  Fatigue is chronic and unchanged  He has had some behavior issues and has been sent home to from workshop intermittently  He denies insomnia, tremors, palpitation, heat or cold intolerance  He has no dry skin or brittle nails  He has no hair growth back from his alopecia  He denies diplopia    He denies compressive thyroid symptoms or difficulties with swallowing  Review of Systems   Constitutional: Positive for fatigue  Negative for appetite change and unexpected weight change  Weight 4 lb more than August 2019  Fatigue unchanged  HENT: Negative for trouble swallowing  Eyes: Negative for visual disturbance  No diplopia  Respiratory: Negative for chest tightness and shortness of breath  Cardiovascular: Negative for chest pain and palpitations  Gastrointestinal: Negative for abdominal pain, constipation, diarrhea and nausea  Endocrine: Negative for cold intolerance and heat intolerance  Skin: Negative for rash  No hair growth  No dry skin  No brittle nails  Neurological: Negative for dizziness, tremors, light-headedness and headaches  Psychiatric/Behavioral: Negative for sleep disturbance  Some behavior issues and sent home from workshop at times due to behavior         Historical Information   Past Medical History:   Diagnosis Date    Alopecia areata totalis     [facility])     Down syndrome     Heart murmur     Hypertriglyceridemia     Mood disorder ([facility])     Pneumothorax     as a child    Raynaud's phenomenon     Sensorineural hearing loss     VSD (ventricular septal defect)      Past Surgical History:   Procedure Laterality Date    CRICOID SPLIT      MYRINGOTOMY W/ TUBES Bilateral     TONSILLECTOMY       Social History   Social History     Substance and Sexual Activity   Alcohol Use No     Social History     Substance and Sexual Activity   Drug Use No     Social History     Tobacco Use   Smoking Status Never Smoker   Smokeless Tobacco Never Used     Family History:   Family History   Problem Relation Age of Onset    Sleep apnea Mother     Heart attack Mother     Hyperlipidemia Mother     Raynaud syndrome Mother     No Known Problems Father     No Known Problems Sister     No Known Problems Brother     Diabetes type II Family     Parkinsonism Family Meds/Allergies   Current Outpatient Medications   Medication Sig Dispense Refill    amLODIPine (NORVASC) 5 mg tablet Take 5 mg by mouth daily      levothyroxine 50 mcg tablet 1 tablet 4 days a week and 1 5 tablets 3 days a week on Monday, Wednesday, and friday (Patient taking differently: Take one tablet 4 days a week, Sun, Tues, Thurs, Sat) 40 tablet 11    levothyroxine 75 mcg tablet Take 75 mcg by mouth daily Monday, Wednesday, friday      Omega-3 Fatty Acids (FISH OIL) 1,000 mg Take 1,000 mg by mouth daily      OXcarbazepine (TRILEPTAL) 600 mg tablet Take 600 mg by mouth every 12 (twelve) hours        Pediatric Multivit-Minerals-C (FLINTSTONES COMPLETE PO) Take 1 each by mouth daily      QUEtiapine (SEROquel) 100 mg tablet Take 100 mg by mouth 2 (two) times a day At 2 pm and 8 pm       QUEtiapine (SEROquel) 50 mg tablet Take 50 mg by mouth daily in the early morning         No current facility-administered medications for this visit  Allergies   Allergen Reactions    Debrox [Carbamide Peroxide]     Latex     Morphine        Objective   Vitals: Blood pressure 122/88, pulse 72, height 5' 2" (1 575 m), weight 74 6 kg (164 lb 6 4 oz)  Invasive Devices     None                 Physical Exam   Constitutional: He is oriented to person, place, and time  He appears well-developed and well-nourished  HENT:   Head: Normocephalic and atraumatic  Eyes: Pupils are equal, round, and reactive to light  Conjunctivae and EOM are normal    No lid lag, stare, proptosis, or periorbital edema  Neck: Normal range of motion  Neck supple  No thyromegaly present  Thyroid normal in size without palpable thyroid nodule  No carotid bruits  Cardiovascular: Normal rate, regular rhythm and normal heart sounds  No murmur heard  Pulmonary/Chest: Effort normal and breath sounds normal  He has no wheezes  Musculoskeletal: Normal range of motion  He exhibits no edema or deformity     No tremor of the outstretched hands  Lymphadenopathy:     He has no cervical adenopathy  Neurological: He is alert and oriented to person, place, and time  He has normal reflexes  Deep tendon reflexes normal    Skin: Skin is warm and dry  No rash noted  No erythema  Alopecia to the scalp  Vitals reviewed  The history was obtained from the review of the chart and from the patient and group home aide      Lab Results:      Blood work done on 02/08/2020 demonstrates the following results:    Lab Results   Component Value Date    TSH 1 540 02/08/2020    FREET4 0 81 (L) 02/08/2020       Future Appointments   Date Time Provider Clayton Rivera   9/10/2020 10:00 AM Madeline Castillo MD [facility]

## 2020-08-27 LAB
T4 FREE SERPL-MCNC: 0.83 NG/DL (ref 0.82–1.77)
TSH SERPL DL<=0.005 MIU/L-ACNC: 1.74 UIU/ML (ref 0.45–4.5)

## 2020-12-09 ENCOUNTER — TELEPHONE (OUTPATIENT)
Dept: ENDOCRINOLOGY | Facility: HOSPITAL | Age: 35
End: 2020-12-09

## 2020-12-15 ENCOUNTER — TELEPHONE (OUTPATIENT)
Dept: ENDOCRINOLOGY | Facility: HOSPITAL | Age: 35
End: 2020-12-15

## 2020-12-15 ENCOUNTER — TELEMEDICINE (OUTPATIENT)
Dept: ENDOCRINOLOGY | Facility: HOSPITAL | Age: 35
End: 2020-12-15
Payer: COMMERCIAL

## 2020-12-15 DIAGNOSIS — E06.3 HYPOTHYROIDISM DUE TO HASHIMOTO'S THYROIDITIS: Primary | ICD-10-CM

## 2020-12-15 DIAGNOSIS — E03.8 HYPOTHYROIDISM DUE TO HASHIMOTO'S THYROIDITIS: Primary | ICD-10-CM

## 2020-12-15 PROCEDURE — 99213 OFFICE O/P EST LOW 20 MIN: CPT | Performed by: PHYSICIAN ASSISTANT

## 2021-06-02 LAB
T4 FREE SERPL-MCNC: 0.82 NG/DL (ref 0.82–1.77)
TSH SERPL DL<=0.005 MIU/L-ACNC: 1.46 UIU/ML (ref 0.45–4.5)

## 2021-06-16 ENCOUNTER — OFFICE VISIT (OUTPATIENT)
Dept: ENDOCRINOLOGY | Facility: HOSPITAL | Age: 36
End: 2021-06-16
Payer: COMMERCIAL

## 2021-06-16 VITALS
WEIGHT: 158.8 LBS | DIASTOLIC BLOOD PRESSURE: 80 MMHG | BODY MASS INDEX: 29.22 KG/M2 | HEART RATE: 79 BPM | HEIGHT: 62 IN | SYSTOLIC BLOOD PRESSURE: 122 MMHG

## 2021-06-16 DIAGNOSIS — E03.8 HYPOTHYROIDISM DUE TO HASHIMOTO'S THYROIDITIS: Primary | ICD-10-CM

## 2021-06-16 DIAGNOSIS — E06.3 HYPOTHYROIDISM DUE TO HASHIMOTO'S THYROIDITIS: Primary | ICD-10-CM

## 2021-06-16 PROCEDURE — 99213 OFFICE O/P EST LOW 20 MIN: CPT | Performed by: PHYSICIAN ASSISTANT

## 2021-06-16 NOTE — PROGRESS NOTES
Karlie Score 37 y o  male MRN: [id number]    Encounter: [id number]      Assessment/Plan     Assessment: This is a 37y o -year-old male with   Hypothyroidism due Hashimoto's thyroiditis  Plan:  Hypothyroidism due to Hashimoto's thyroiditis:   thyroid lab work came back normal   Clinically and biochemically euthyroid  Continue with levothyroxine 50 mcg 1 tablet 4 days a week and comes that she does feel free 75 mcg 3 days a week  Contact the office if there is any change in symptoms  Follow-up in 6 months with lab work completed prior to visit  CC:  Hypothyroidism follow-up    History of Present Illness     HPI:  Karlie Velazquez is a 37 y o  male with hypothyroidism due to Hashimoto's thyroiditis here for follow-up with caretaker  Has intellectual disability, and has little communication skills  Dominick Cora had a lot of blood work fluctuations in 2017 with dosage adjustments in his thyroid hormone and he was brought to an endocrinologist for evaluation at that time  [place] thyroid hormone dosage was set and he has had slow various adjustments over the years since then but only minor changes   He does have a history of alopecia totalis      He is currently taking levothyroxine 50 mcg 1 tablet 4 days a week on Sunday, Tuesday, Thursday, and Saturday and levothyroxine 75 mcg 1 tablet 3 days a week on Monday, Wednesday, and Friday   Weight is 3 lb more than March 2020  Fatigue is chronic and unchanged, likely due to psychiatric medications   He has had some behavior issues and mood has been stable recently  Dominick Shepherd denies insomnia, tremors, palpitation, heat or cold intolerance   He has no dry skin or brittle nails   He has no hair growth back from his alopecia   He denies diplopia   He denies compressive thyroid symptoms or difficulties with swallowing  Review of Systems   Reason unable to perform ROS: Mostly answered by Caretaker  Constitutional: Positive for fatigue   Negative for activity change, appetite change, chills, diaphoresis and unexpected weight change  HENT: Negative for sore throat, trouble swallowing and voice change  Eyes: Negative for visual disturbance  Respiratory: Negative for shortness of breath  Cardiovascular: Negative for palpitations and leg swelling  Gastrointestinal: Negative for abdominal pain, constipation and diarrhea  Endocrine: Negative for cold intolerance, heat intolerance, polydipsia, polyphagia and polyuria  Skin: Negative for rash  Alopecia totalis   Neurological: Negative for dizziness, tremors, weakness, light-headedness, numbness and headaches  Hematological: Negative for adenopathy  Psychiatric/Behavioral: Positive for sleep disturbance (Wakes up at 4 am and will nap in the afternoon  )  Negative for dysphoric mood  The patient is not nervous/anxious          Historical Information   Past Medical History:   Diagnosis Date    Alopecia areata totalis     [facility]     Down syndrome     Heart murmur     Hypertriglyceridemia     Mood disorder ([facility])     Pneumothorax     as a child    Raynaud's phenomenon     Sensorineural hearing loss     VSD (ventricular septal defect)      Past Surgical History:   Procedure Laterality Date    CRICOID SPLIT      MYRINGOTOMY W/ TUBES Bilateral     TONSILLECTOMY       Social History   Social History     Substance and Sexual Activity   Alcohol Use No     Social History     Substance and Sexual Activity   Drug Use No     Social History     Tobacco Use   Smoking Status Never Smoker   Smokeless Tobacco Never Used     Family History:   Family History   Problem Relation Age of Onset    Sleep apnea Mother     Heart attack Mother     Hyperlipidemia Mother     Raynaud syndrome Mother     No Known Problems Father     No Known Problems Sister     No Known Problems Brother     Diabetes type II Family     Parkinsonism Family        Meds/Allergies   Current Outpatient Medications   Medication Sig Dispense Refill    amLODIPine (NORVASC) 5 mg tablet Take 5 mg by mouth daily      levothyroxine 50 mcg tablet 1 tablet 4 days a week and 1 5 tablets 3 days a week on Monday, Wednesday, and friday (Patient taking differently: Take one tablet 4 days a week, Sun, Tues, Thurs, Sat) 40 tablet 11    levothyroxine 75 mcg tablet Take 75 mcg by mouth 3 (three) times a week Monday, Wednesday, friday       magnesium hydroxide (MILK OF MAGNESIA) 400 mg/5 mL oral suspension Take by mouth daily as needed for constipation      Omega-3 Fatty Acids (FISH OIL) 1,000 mg Take 1,000 mg by mouth daily      OXcarbazepine (TRILEPTAL) 600 mg tablet Take 600 mg by mouth every 12 (twelve) hours        Pediatric Multivit-Minerals-C (FLINTSTONES COMPLETE PO) Take 1 each by mouth daily      QUEtiapine (SEROquel) 100 mg tablet Take 100 mg by mouth 2 (two) times a day At 2 pm and 8 pm       QUEtiapine (SEROquel) 50 mg tablet Take 50 mg by mouth daily in the early morning         No current facility-administered medications for this visit  Allergies   Allergen Reactions    Debrox [Carbamide Peroxide]     Latex     Morphine        Objective   Vitals: There were no vitals taken for this visit  Physical Exam  Vitals and nursing note reviewed  Constitutional:       General: He is not in acute distress  Appearance: Normal appearance  He is not diaphoretic  HENT:      Head: Normocephalic and atraumatic  Eyes:      General: No scleral icterus  Extraocular Movements: Extraocular movements intact  Conjunctiva/sclera: Conjunctivae normal       Pupils: Pupils are equal, round, and reactive to light  Cardiovascular:      Rate and Rhythm: Normal rate and regular rhythm  Heart sounds: No murmur heard  Pulmonary:      Effort: Pulmonary effort is normal  No respiratory distress  Breath sounds: Normal breath sounds  No wheezing     Musculoskeletal:      Cervical back: Normal range of motion  Right lower leg: No edema  Left lower leg: No edema  Lymphadenopathy:      Cervical: No cervical adenopathy  Skin:     General: Skin is warm and dry  Neurological:      Mental Status: He is alert and oriented to person, place, and time  Mental status is at baseline  Sensory: No sensory deficit  Psychiatric:         Mood and Affect: Mood normal          Behavior: Behavior normal          Thought Content: Thought content normal          The history was obtained from the review of the chart, caretaker  Lab Results:   Lab Results   Component Value Date/Time    Free t4 0 82 06/01/2021 11:59 AM    Free t4 0 83 08/26/2020 12:23 PM       Portions of the record may have been created with voice recognition software  Occasional wrong word or "sound a like" substitutions may have occurred due to the inherent limitations of voice recognition software  Read the chart carefully and recognize, using context, where substitutions have occurred

## 2021-08-09 LAB
T4 FREE SERPL-MCNC: 0.88 NG/DL (ref 0.82–1.77)
TSH SERPL DL<=0.005 MIU/L-ACNC: 3.84 UIU/ML (ref 0.45–4.5)

## 2021-08-23 ENCOUNTER — TELEPHONE (OUTPATIENT)
Dept: ENDOCRINOLOGY | Facility: HOSPITAL | Age: 36
End: 2021-08-23

## 2021-10-04 NOTE — PATIENT INSTRUCTIONS
ACUTE PHYSICAL THERAPY GOALS:  (Developed with and agreed upon by patient and/or caregiver. )  LT. Pt will be able to ambulate 100 ft with independence and use of least restrictive device in order to return to home and community ambulation within 7 treatment days. 2. Pt will be able to tolerate 25 minutes of standing activity with 1-2 rest breaks while maintaining SpO2 at 90% or greater in order to demonstrate improved activity tolerance within 7 days. PHYSICAL THERAPY: Daily Note and AM Treatment Day # 3    Doni Putnam is a 41 y.o. male   PRIMARY DIAGNOSIS: COVID-19  COVID-19 [U07.1]  Acute respiratory failure ([facility] [address] [J96.00]         ASSESSMENT:     REHAB RECOMMENDATIONS: CURRENT LEVEL OF FUNCTION:  (Most Recently Demonstrated)   Recommendation to date pending progress:  Setting:   No further skilled therapy   Equipment:    None Bed Mobility:   Independent  Sit to Stand:   Independent  Transfers:   Not tested  Gait/Mobility:   Modified Independent     ASSESSMENT:  Mr. Esvin Putnam is making great progress towards PT goals as noted by increased gait distance, activity tolerance and overall mobility. Patient is up in his room ad darwin and encouraged him to ambulated in his room throughout the day. Reviewed activity pacing, incentive spirometer use, energy conservation techniques and pursed lipped breathing. Patient verbalized understanding. Patient is being discharged from PT at this time. Patient agreeable to plan. Please reorder PT if patient would benefit from our services in the future.      SUBJECTIVE:   Mr. Evsin Putnam states, \"I'm worried about my wife\"    SOCIAL HISTORY/ LIVING ENVIRONMENT: refer to [facility]  Home Environment: Private residence  One/Two Story Residence: One story  Living Alone: No  Support Systems: Spouse/Significant Other  OBJECTIVE:     PAIN: VITAL SIGNS: LINES/DRAINS:   Pre Treatment: Pain Screen  Pain Scale 1: FLACC  Pain Intensity 1: 0  Post Treatment: 0   none  O2 Device: Hi Thyroid lab work is stable  Continue with levothyroxine 50 mcg Tuesday, Thursday, Saturday and Sunday  Continue with levothyroxine 75 mcg Monday, Wednesday and Friday      Get lab work completed prior to next office visit, and follow-up in 6 months      Call the office if there are any changes in symptoms  flow nasal cannula     MOBILITY: I Mod I S SBA CGA Min Mod Max Total  NT x2 Comments:   Bed Mobility    Rolling [] [] [] [] [] [] [] [] [] [] []    Supine to Sit [] [] [] [] [] [] [] [] [] [] []    Scooting [] [] [] [] [] [] [] [] [] [] []    Sit to Supine [] [] [] [] [] [] [] [] [] [] []    Transfers    Sit to Stand [] [x] [] [] [] [] [] [] [] [] []    Bed to Chair [] [] [] [] [] [] [] [] [] [] []    Stand to Sit [] [x] [] [] [] [] [] [] [] [] []    I=Independent, Mod I=Modified Independent, S=Supervision, SBA=Standby Assistance, CGA=Contact Guard Assistance,   Min=Minimal Assistance, Mod=Moderate Assistance, Max=Maximal Assistance, Total=Total Assistance, NT=Not Tested    BALANCE: Good Fair+ Fair Fair- Poor NT Comments   Sitting Static [x] [] [] [] [] []    Sitting Dynamic [x] [] [] [] [] []              Standing Static [x] [] [] [] [] []    Standing Dynamic [x] [] [] [] [] []      GAIT: I Mod I S SBA CGA Min Mod Max Total  NT x2 Comments:   Level of Assistance [x] [] [x] [] [] [] [] [] [] [] []    Distance 200' x 2    DME None    Gait Quality No deficits noted    Weightbearing  Status N/A     I=Independent, Mod I=Modified Independent, S=Supervision, SBA=Standby Assistance, CGA=Contact Guard Assistance,   Min=Minimal Assistance, Mod=Moderate Assistance, Max=Maximal Assistance, Total=Total Assistance, NT=Not Tested    PLAN:   FREQUENCY/DURATION: PT Plan of Care: 3 times/week for duration of hospital stay or until stated goals are met, whichever comes first.  TREATMENT:     TREATMENT:   ($$ Therapeutic Activity: 23-37 mins    )  Therapeutic Activity (23 Minutes): Therapeutic activity included Transfer Training, Ambulation on level ground, Sitting balance , Standing balance and review of pursed lipped breathing, incentive spirometer use, activity pacing, and energy conservation techniques to improve functional Mobility, Strength and Activity tolerance.     TREATMENT GRID:   Date:  9/30 Date:   Date: Activity/Exercise Parameters Parameters Parameters   Standing marches 2x10     Mini squats 2x10                                         AFTER TREATMENT POSITION/PRECAUTIONS:  Chair, Needs within reach and RN notified    INTERDISCIPLINARY COLLABORATION:  RN/PCT and PT/PTA    TOTAL TREATMENT DURATION:  PT Patient Time In/Time Out  Time In: 0945  Time Out: [facility]

## 2021-12-16 ENCOUNTER — OFFICE VISIT (OUTPATIENT)
Dept: ENDOCRINOLOGY | Facility: HOSPITAL | Age: 36
End: 2021-12-16
Payer: COMMERCIAL

## 2021-12-16 VITALS
SYSTOLIC BLOOD PRESSURE: 100 MMHG | BODY MASS INDEX: 28.05 KG/M2 | HEART RATE: 84 BPM | WEIGHT: 152.4 LBS | HEIGHT: 62 IN | DIASTOLIC BLOOD PRESSURE: 64 MMHG

## 2021-12-16 DIAGNOSIS — E03.8 HYPOTHYROIDISM DUE TO HASHIMOTO'S THYROIDITIS: Primary | ICD-10-CM

## 2021-12-16 DIAGNOSIS — E06.3 HYPOTHYROIDISM DUE TO HASHIMOTO'S THYROIDITIS: Primary | ICD-10-CM

## 2021-12-16 PROCEDURE — 99213 OFFICE O/P EST LOW 20 MIN: CPT | Performed by: PHYSICIAN ASSISTANT

## 2021-12-16 RX ORDER — CLOBETASOL PROPIONATE 0.5 MG/G
OINTMENT TOPICAL 2 TIMES DAILY
COMMUNITY

## 2022-06-05 LAB
ALBUMIN SERPL-MCNC: 5 G/DL (ref 4–5)
ALBUMIN/GLOB SERPL: 1.9 {RATIO} (ref 1.2–2.2)
ALP SERPL-CCNC: 103 IU/L (ref 44–121)
ALT SERPL-CCNC: 36 IU/L (ref 0–44)
AST SERPL-CCNC: 23 IU/L (ref 0–40)
BILIRUB SERPL-MCNC: 0.3 MG/DL (ref 0–1.2)
BUN SERPL-MCNC: 11 MG/DL (ref 6–20)
BUN/CREAT SERPL: 13 (ref 9–20)
CALCIUM SERPL-MCNC: 9.7 MG/DL (ref 8.7–10.2)
CHLORIDE SERPL-SCNC: 93 MMOL/L (ref 96–106)
CO2 SERPL-SCNC: 28 MMOL/L (ref 20–29)
CREAT SERPL-MCNC: 0.88 MG/DL (ref 0.76–1.27)
EGFR: 116 ML/MIN/1.73
GLOBULIN SER-MCNC: 2.6 G/DL (ref 1.5–4.5)
GLUCOSE SERPL-MCNC: 96 MG/DL (ref 65–99)
POTASSIUM SERPL-SCNC: 4.5 MMOL/L (ref 3.5–5.2)
PROT SERPL-MCNC: 7.6 G/DL (ref 6–8.5)
SODIUM SERPL-SCNC: 134 MMOL/L (ref 134–144)
T4 FREE SERPL-MCNC: 0.96 NG/DL (ref 0.82–1.77)
TSH SERPL DL<=0.005 MIU/L-ACNC: 1.97 UIU/ML (ref 0.45–4.5)

## 2022-06-16 ENCOUNTER — OFFICE VISIT (OUTPATIENT)
Dept: ENDOCRINOLOGY | Facility: HOSPITAL | Age: 37
End: 2022-06-16

## 2022-06-16 VITALS
OXYGEN SATURATION: 95 % | DIASTOLIC BLOOD PRESSURE: 82 MMHG | WEIGHT: 150.6 LBS | SYSTOLIC BLOOD PRESSURE: 128 MMHG | BODY MASS INDEX: 27.71 KG/M2 | HEIGHT: 62 IN | HEART RATE: 67 BPM

## 2022-06-16 DIAGNOSIS — E06.3 HYPOTHYROIDISM DUE TO HASHIMOTO'S THYROIDITIS: Primary | ICD-10-CM

## 2022-06-16 DIAGNOSIS — E03.8 HYPOTHYROIDISM DUE TO HASHIMOTO'S THYROIDITIS: Primary | ICD-10-CM

## 2022-06-16 RX ORDER — MULTIVITAMIN WITH FOLIC ACID 400 MCG
TABLET ORAL
COMMUNITY
Start: 2022-05-20

## 2022-06-16 NOTE — PROGRESS NOTES
Ming Rodriguez 31 y o  male MRN: [id number]    Encounter: [id number]      Assessment/Plan     Assessment: This is a 31y o -year-old male with hypothyroidism due to Hashimoto's thyroiditis  Plan:  1  Hypothyroidism due to Hashimoto's thyroiditis:  Thyroid lab work was normal   Has not had any significant changes in his group home  At this time he will continue with levothyroxine 50 mcg 1 tablet 4 days a week and levothyroxine 75 mcg 1 tablet 3 days a week  Contact the office if there is any change in symptoms  Follow-up in 6 months with lab work completed prior to visit      CC:  Hypothyroidism follow-up    History of Present Illness     HPI:  Rylie Fernandez a 31 y  o  male with hypothyroidism due to Hashimoto's thyroiditis here for follow-up with caretaker   Has intellectual disability, and has little communication skills   He had a lot of blood work fluctuations in 2017 with dosage adjustments in his thyroid hormone and he was brought to an endocrinologist for evaluation at that time  [facility] thyroid hormone dosage was set and he has had slow various adjustments over the years since then but only minor changes   He does have a history of alopecia totalis      He is currently taking levothyroxine 50 mcg 1 tablet 4 days a week on Sunday, Tuesday, Thursday, and Saturday and levothyroxine 75 mcg 1 tablet 3 days a week on Monday, Wednesday, and Friday  Has lost 6 lb since last office visit  Has been walking more on the weekends  Fatigue is chronic and unchanged, likely due to psychiatric medications  Recently has been taking a nap in the morning  Some concerns with mood swings since his last office visit  Creed Trapster denies insomnia, tremors, palpitation, heat or cold intolerance   He has no brittle nails   He has no hair growth back from his alopecia   He denies diplopia   He denies compressive thyroid symptoms or difficulties with swallowing  Overall doing well today      Review of Systems   Reason unable to perform ROS: Mostly answered by Caretaker  Constitutional: Positive for fatigue  Negative for activity change, appetite change, chills, diaphoresis and unexpected weight change  HENT: Negative for sore throat, trouble swallowing and voice change  Eyes: Negative for photophobia, discharge, redness, itching and visual disturbance  Respiratory: Negative for shortness of breath  Cardiovascular: Negative for palpitations and leg swelling  Gastrointestinal: Negative for abdominal pain, constipation and diarrhea  Endocrine: Negative for cold intolerance, heat intolerance, polydipsia, polyphagia and polyuria  Skin: Negative for rash  Alopecia totalis   Neurological: Negative for dizziness, tremors, weakness, light-headedness, numbness and headaches  Hematological: Negative for adenopathy  Psychiatric/Behavioral: Negative for dysphoric mood and sleep disturbance  The patient is not nervous/anxious          Historical Information   Past Medical History:   Diagnosis Date    Alopecia areata totalis     [facility])     Down syndrome     Heart murmur     Hypertriglyceridemia     Mood disorder ([facility])     Pneumothorax     as a child    Raynaud's phenomenon     Sensorineural hearing loss     VSD (ventricular septal defect)      Past Surgical History:   Procedure Laterality Date    CRICOID SPLIT      MYRINGOTOMY W/ TUBES Bilateral     TONSILLECTOMY       Social History   Social History     Substance and Sexual Activity   Alcohol Use No     Social History     Substance and Sexual Activity   Drug Use No     Social History     Tobacco Use   Smoking Status Never Smoker   Smokeless Tobacco Never Used     Family History:   Family History   Problem Relation Age of Onset    Sleep apnea Mother     Heart attack Mother     Hyperlipidemia Mother     Raynaud syndrome Mother     No Known Problems Father     No Known Problems Sister     No Known Problems Brother     Diabetes type II Family     Parkinsonism Family        Meds/Allergies   Current Outpatient Medications   Medication Sig Dispense Refill    amLODIPine (NORVASC) 5 mg tablet Take 5 mg by mouth daily      clobetasol (TEMOVATE) 0 05 % ointment Apply topically 2 (two) times a day      levothyroxine 50 mcg tablet 1 tablet 4 days a week and 1 5 tablets 3 days a week on Monday, Wednesday, and friday (Patient taking differently: Take one tablet 4 days a week, Sun, Tues, Thurs, Sat) 40 tablet 11    levothyroxine 75 mcg tablet Take 75 mcg by mouth 3 (three) times a week Monday, Wednesday, friday       magnesium hydroxide (MILK OF MAGNESIA) 400 mg/5 mL oral suspension Take by mouth daily as needed for constipation      Multiple Vitamin (Daily-Felice) TABS       Omega-3 Fatty Acids (FISH OIL) 1,000 mg Take 1,000 mg by mouth daily      OXcarbazepine (TRILEPTAL) 600 mg tablet Take 600 mg by mouth every 12 (twelve) hours        QUEtiapine (SEROquel) 100 mg tablet Take 100 mg by mouth 2 (two) times a day At 2 pm and 8 pm       QUEtiapine (SEROquel) 50 mg tablet Take 50 mg by mouth daily in the early morning         No current facility-administered medications for this visit  Allergies   Allergen Reactions    Debrox [Carbamide Peroxide]     Latex     Morphine        Objective   Vitals: Blood pressure 128/82, pulse 67, height 5' 2" (1 575 m), weight 68 3 kg (150 lb 9 6 oz), SpO2 95 %  Physical Exam  Vitals and nursing note reviewed  Constitutional:       General: He is not in acute distress  Appearance: Normal appearance  He is not diaphoretic  HENT:      Head: Normocephalic and atraumatic  Eyes:      General: No scleral icterus  Extraocular Movements: Extraocular movements intact  Conjunctiva/sclera: Conjunctivae normal       Pupils: Pupils are equal, round, and reactive to light  Neck:      Thyroid: No thyroid mass, thyromegaly or thyroid tenderness     Cardiovascular:      Rate and Rhythm: Normal rate and regular rhythm  Heart sounds: No murmur heard  Pulmonary:      Effort: Pulmonary effort is normal  No respiratory distress  Breath sounds: Normal breath sounds  No wheezing  Musculoskeletal:      Cervical back: Normal range of motion  Right lower leg: No edema  Left lower leg: No edema  Lymphadenopathy:      Cervical: No cervical adenopathy  Skin:     General: Skin is warm and dry  Neurological:      Mental Status: He is alert and oriented to person, place, and time  Mental status is at baseline  Sensory: No sensory deficit  Motor: No tremor  Gait: Gait normal       Deep Tendon Reflexes:      Reflex Scores:       Patellar reflexes are 2+ on the right side and 2+ on the left side  Psychiatric:         Mood and Affect: Mood normal          Behavior: Behavior normal          Thought Content: Thought content normal          The history was obtained from the review of the chart, patient  Lab Results:   Lab Results   Component Value Date/Time    Free t4 0 96 06/04/2022 08:54 AM    Free t4 0 88 08/07/2021 09:01 AM         Portions of the record may have been created with voice recognition software  Occasional wrong word or "sound a like" substitutions may have occurred due to the inherent limitations of voice recognition software  Read the chart carefully and recognize, using context, where substitutions have occurred

## 2022-06-16 NOTE — PATIENT INSTRUCTIONS
Thyroid lab work is stable  Continue with levothyroxine 50 mcg Tuesday, Thursday, Saturday and Sunday  Continue with levothyroxine 75 mcg Monday, Wednesday and Friday  Get lab work completed prior to next office visit, and follow-up in 6 months  Call the office if there are any changes in symptoms

## 2022-12-16 ENCOUNTER — OFFICE VISIT (OUTPATIENT)
Dept: ENDOCRINOLOGY | Facility: HOSPITAL | Age: 37
End: 2022-12-16

## 2022-12-16 VITALS
DIASTOLIC BLOOD PRESSURE: 80 MMHG | SYSTOLIC BLOOD PRESSURE: 128 MMHG | HEIGHT: 62 IN | HEART RATE: 68 BPM | BODY MASS INDEX: 27.97 KG/M2 | OXYGEN SATURATION: 97 % | WEIGHT: 152 LBS

## 2022-12-16 DIAGNOSIS — E03.8 HYPOTHYROIDISM DUE TO HASHIMOTO'S THYROIDITIS: Primary | ICD-10-CM

## 2022-12-16 DIAGNOSIS — E06.3 HYPOTHYROIDISM DUE TO HASHIMOTO'S THYROIDITIS: Primary | ICD-10-CM

## 2022-12-16 NOTE — PROGRESS NOTES
Yana Solis 30 y o  male MRN: [id number]    Encounter: [id number]      Assessment/Plan     Assessment: This is a 30y o -year-old male with hypothyroidism due to Hashimoto's thyroiditis  Plan:  1  Hypothyroidism due to Hashimoto's thyroiditis: Most recent thyroid lab work came back normal   Clinically and biochemically euthyroid  At this time he will continue with levothyroxine 50 mcg 4 days a week and levothyroxine 75 mcg 3 days a week  Asked caretaker to contact the office if there is any concerns or change in symptoms  At this time he will follow-up in 6 months with lab work completed prior to visit  CC: Hypothyroidism follow-up    History of Present Illness     HPI:  Neisha Louis a 37 y  o  male with hypothyroidism due to Hashimoto's thyroiditis here for follow-up with caretaker   Has intellectual disability, and has little communication skills   He had a lot of blood work fluctuations in 2017 with dosage adjustments in his thyroid hormone and he was brought to an endocrinologist for evaluation at that time  [facility] thyroid hormone dosage was set and he has had slow various adjustments over the years since then but only minor changes   He does have a history of alopecia totalis      He is currently taking levothyroxine 50 mcg 1 tablet 4 days a week on Sunday, Tuesday, Thursday, and Saturday and levothyroxine 75 mcg 1 tablet 3 days a week on Monday, Wednesday, and Friday   Has gained 2 lb since last office visit  Boy Doan been walking more on the weekends  No change in fatigue symptoms  Appears to have enough energy  No concerns with changes in mood   He denies insomnia, tremors, palpitation, heat or cold intolerance   He has no brittle nails   He has no hair growth back from his alopecia  No change in bowel habits with diarrhea or constipation   He denies diplopia   He denies compressive thyroid symptoms or difficulties with swallowing  Overall doing well today      Review of Systems Reason unable to perform ROS: Mostly answered by Caretaker  Constitutional: Negative for activity change, appetite change, chills, diaphoresis, fatigue and unexpected weight change  HENT: Negative for sore throat, trouble swallowing and voice change  Eyes: Negative for photophobia, discharge, redness, itching and visual disturbance  Respiratory: Negative for shortness of breath  Cardiovascular: Negative for palpitations and leg swelling  Gastrointestinal: Negative for abdominal pain, constipation and diarrhea  Endocrine: Negative for cold intolerance, heat intolerance, polydipsia, polyphagia and polyuria  Skin: Negative for rash  Alopecia totalis   Neurological: Negative for dizziness, tremors, weakness, light-headedness, numbness and headaches  Hematological: Negative for adenopathy  Psychiatric/Behavioral: Negative for dysphoric mood and sleep disturbance  The patient is not nervous/anxious          Historical Information   Past Medical History:   Diagnosis Date   • Alopecia areata totalis    • COPD (chronic obstructive pulmonary disease) ([facility])    • Down syndrome    • Heart murmur    • Hypertriglyceridemia    • Mood disorder ([facility])    • Pneumothorax     as a child   • Raynaud's phenomenon    • Sensorineural hearing loss    • VSD (ventricular septal defect)      Past Surgical History:   Procedure Laterality Date   • CRICOID SPLIT     • MYRINGOTOMY W/ TUBES Bilateral    • TONSILLECTOMY       Social History   Social History     Substance and Sexual Activity   Alcohol Use No     Social History     Substance and Sexual Activity   Drug Use No     Social History     Tobacco Use   Smoking Status Never   Smokeless Tobacco Never     Family History:   Family History   Problem Relation Age of Onset   • Sleep apnea Mother    • Heart attack Mother    • Hyperlipidemia Mother    • Raynaud syndrome Mother    • No Known Problems Father    • No Known Problems Sister    • No Known Problems Brother    • Diabetes type II Family    • Parkinsonism Family        Meds/Allergies   Current Outpatient Medications   Medication Sig Dispense Refill   • amLODIPine (NORVASC) 5 mg tablet Take 5 mg by mouth daily     • Bioflavonoid Products (CHING-C PO) Take by mouth     • clobetasol (TEMOVATE) 0 05 % ointment Apply topically 2 (two) times a day     • levothyroxine 50 mcg tablet 1 tablet 4 days a week and 1 5 tablets 3 days a week on Monday, Wednesday, and friday (Patient taking differently: 1 tablet 4 days a week) 40 tablet 11   • levothyroxine 75 mcg tablet Take 75 mcg by mouth 3 (three) times a week Monday, Wednesday, friday     • magnesium hydroxide (MILK OF MAGNESIA) 400 mg/5 mL oral suspension Take by mouth daily as needed for constipation     • Multiple Vitamin (Daily-Felice) TABS      • Omega-3 Fatty Acids (FISH OIL) 1,000 mg Take 1,000 mg by mouth daily     • OXcarbazepine (TRILEPTAL) 600 mg tablet Take 600 mg by mouth every 12 (twelve) hours       • QUEtiapine (SEROquel) 100 mg tablet Take 100 mg by mouth 2 (two) times a day At 2 pm and 8 pm      • QUEtiapine (SEROquel) 50 mg tablet Take 50 mg by mouth daily in the early morning         No current facility-administered medications for this visit  Allergies   Allergen Reactions   • Debrox [Carbamide Peroxide]    • Latex    • Morphine        Objective   Vitals: Blood pressure 128/80, pulse 68, height 5' 2" (1 575 m), weight 68 9 kg (152 lb), SpO2 97 %  Physical Exam  Vitals and nursing note reviewed  Constitutional:       General: He is not in acute distress  Appearance: Normal appearance  He is not diaphoretic  HENT:      Head: Normocephalic and atraumatic  Eyes:      General: No scleral icterus  Extraocular Movements: Extraocular movements intact  Conjunctiva/sclera: Conjunctivae normal       Pupils: Pupils are equal, round, and reactive to light  Cardiovascular:      Rate and Rhythm: Normal rate and regular rhythm        Heart sounds: No murmur heard   Pulmonary:      Effort: Pulmonary effort is normal  No respiratory distress  Breath sounds: Normal breath sounds  No wheezing  Musculoskeletal:      Cervical back: Normal range of motion  Right lower leg: No edema  Left lower leg: No edema  Lymphadenopathy:      Cervical: No cervical adenopathy  Skin:     General: Skin is warm and dry  Neurological:      Mental Status: He is alert and oriented to person, place, and time  Mental status is at baseline  Sensory: No sensory deficit  Gait: Gait normal    Psychiatric:         Mood and Affect: Mood normal          Behavior: Behavior normal          Thought Content: Thought content normal          The history was obtained from the review of the chart, patient  Lab Results:   Lab Results   Component Value Date/Time    Free t4 0 96 06/04/2022 08:54 AM       Portions of the record may have been created with voice recognition software  Occasional wrong word or "sound a like" substitutions may have occurred due to the inherent limitations of voice recognition software  Read the chart carefully and recognize, using context, where substitutions have occurred

## 2023-05-23 LAB
T4 FREE SERPL-MCNC: 0.97 NG/DL (ref 0.82–1.77)
TSH SERPL DL<=0.005 MIU/L-ACNC: 3.88 UIU/ML (ref 0.45–4.5)

## 2023-06-09 ENCOUNTER — DOCUMENTATION (OUTPATIENT)
Dept: ENDOCRINOLOGY | Facility: HOSPITAL | Age: 38
End: 2023-06-09

## 2023-06-19 ENCOUNTER — OFFICE VISIT (OUTPATIENT)
Dept: ENDOCRINOLOGY | Facility: HOSPITAL | Age: 38
End: 2023-06-19
Payer: COMMERCIAL

## 2023-06-19 VITALS
OXYGEN SATURATION: 96 % | WEIGHT: 159 LBS | SYSTOLIC BLOOD PRESSURE: 120 MMHG | BODY MASS INDEX: 29.26 KG/M2 | DIASTOLIC BLOOD PRESSURE: 82 MMHG | HEART RATE: 72 BPM | HEIGHT: 62 IN

## 2023-06-19 DIAGNOSIS — E06.3 HYPOTHYROIDISM DUE TO HASHIMOTO'S THYROIDITIS: Primary | ICD-10-CM

## 2023-06-19 DIAGNOSIS — E03.8 HYPOTHYROIDISM DUE TO HASHIMOTO'S THYROIDITIS: Primary | ICD-10-CM

## 2023-06-19 PROCEDURE — 99213 OFFICE O/P EST LOW 20 MIN: CPT | Performed by: PHYSICIAN ASSISTANT

## 2023-06-19 NOTE — PROGRESS NOTES
Mary Kate Brown 30 y o  male MRN: [id number]    Encounter: [id number]      Assessment/Plan     Assessment: This is a 30y o -year-old male with hypothyroidism due to Hashimoto's thyroiditis  Plan:  1  Hypothyroidism due to Hashimoto's thyroiditis: Thyroid lab work was normal   No change in symptoms since last office visit  At this time he will continue with levothyroxine 50 mcg 4 days a week and levothyroxine 75 mcg 3 days a week  Asked caretaker to contact the office if there is any concerns or change in symptoms  At this time he will follow-up in 6 months with lab work completed prior to visit  CC: Hypothyroidism follow-up    History of Present Illness     HPI:  Princess Hinkle a 37 y  o  male with hypothyroidism due to Hashimoto's thyroiditis here for follow-up with caretaker   Has intellectual disability, and has little communication skills   He had a lot of blood work fluctuations in 2017 with dosage adjustments in his thyroid hormone and he was brought to an endocrinologist for evaluation at that time  [facility] thyroid hormone dosage was set and he has had slow various adjustments over the years since then but only minor changes   He does have a history of alopecia totalis      He is currently taking levothyroxine 50 mcg 1 tablet 4 days a week on Sunday, Tuesday, Thursday, and Saturday and levothyroxine 75 mcg 1 tablet 3 days a week on Monday, Wednesday, and Friday   Has gained 2 lb since last office visit  He is taking medication appropriately  He is walking as a form of physical activity, and caretaker has not noticed any fatigue  Appears to have enough energy  No concerns with changes in mood   He denies insomnia, tremors, palpitation, heat or cold intolerance   He has no brittle nails   He has no hair growth back from his alopecia    No change in bowel habits with diarrhea or constipation   He denies diplopia   He denies compressive thyroid symptoms or difficulties with swallowing   Overall doing well today  No concerns or questions today  Review of Systems   Reason unable to perform ROS: Mostly answered by Caretaker  Constitutional: Negative for activity change, appetite change, chills, diaphoresis, fatigue and unexpected weight change  HENT: Negative for sore throat, trouble swallowing and voice change  Eyes: Negative for photophobia, discharge, redness, itching and visual disturbance  Respiratory: Negative for shortness of breath  Cardiovascular: Negative for palpitations and leg swelling  Gastrointestinal: Negative for abdominal pain, constipation and diarrhea  Endocrine: Negative for cold intolerance, heat intolerance, polydipsia, polyphagia and polyuria  Skin: Negative for rash  Alopecia totalis   Neurological: Negative for dizziness, tremors, weakness, light-headedness, numbness and headaches  Hematological: Negative for adenopathy  Psychiatric/Behavioral: Negative for dysphoric mood and sleep disturbance  The patient is not nervous/anxious          Historical Information   Past Medical History:   Diagnosis Date   • Alopecia areata totalis    • COPD (chronic obstructive pulmonary disease) ([facility])    • Down syndrome    • Heart murmur    • Hypertriglyceridemia    • Mood disorder ([facility])    • Pneumothorax     as a child   • Raynaud's phenomenon    • Sensorineural hearing loss    • VSD (ventricular septal defect)      Past Surgical History:   Procedure Laterality Date   • CRICOID SPLIT     • MYRINGOTOMY W/ TUBES Bilateral    • TONSILLECTOMY       Social History   Social History     Substance and Sexual Activity   Alcohol Use No     Social History     Substance and Sexual Activity   Drug Use No     Social History     Tobacco Use   Smoking Status Never   Smokeless Tobacco Never     Family History:   Family History   Problem Relation Age of Onset   • Sleep apnea Mother    • Heart attack Mother    • Hyperlipidemia Mother    • Raynaud syndrome Mother    • "No Known Problems Father    • No Known Problems Sister    • No Known Problems Brother    • Diabetes type II Family    • Parkinsonism Family        Meds/Allergies   Current Outpatient Medications   Medication Sig Dispense Refill   • amLODIPine (NORVASC) 5 mg tablet Take 5 mg by mouth daily     • Bioflavonoid Products (CHING-C PO) Take by mouth     • clobetasol (TEMOVATE) 0 05 % ointment Apply topically 2 (two) times a day     • levothyroxine 50 mcg tablet 1 tablet 4 days a week and 1 5 tablets 3 days a week on Monday, Wednesday, and friday (Patient taking differently: 1 tablet 4 days a week) 40 tablet 11   • levothyroxine 75 mcg tablet Take 75 mcg by mouth 3 (three) times a week Monday, Wednesday, friday     • magnesium hydroxide (MILK OF MAGNESIA) 400 mg/5 mL oral suspension Take by mouth daily as needed for constipation     • Multiple Vitamin (Daily-Felice) TABS      • Omega-3 Fatty Acids (FISH OIL) 1,000 mg Take 1,000 mg by mouth daily     • OXcarbazepine (TRILEPTAL) 600 mg tablet Take 600 mg by mouth every 12 (twelve) hours       • QUEtiapine (SEROquel) 100 mg tablet Take 100 mg by mouth 2 (two) times a day At 2 pm and 8 pm      • QUEtiapine (SEROquel) 50 mg tablet Take 50 mg by mouth daily in the early morning         No current facility-administered medications for this visit  Allergies   Allergen Reactions   • Debrox [Carbamide Peroxide]    • Latex    • Morphine        Objective   Vitals: Blood pressure 120/82, pulse 72, height 5' 2\" (1 575 m), weight 72 1 kg (159 lb), SpO2 96 %  Physical Exam  Vitals and nursing note reviewed  Constitutional:       General: He is not in acute distress  Appearance: Normal appearance  He is not diaphoretic  HENT:      Head: Normocephalic and atraumatic  Eyes:      General: No scleral icterus  Extraocular Movements: Extraocular movements intact  Conjunctiva/sclera: Conjunctivae normal       Pupils: Pupils are equal, round, and reactive to light   " "  Cardiovascular:      Rate and Rhythm: Normal rate and regular rhythm  Heart sounds: No murmur heard  Pulmonary:      Effort: Pulmonary effort is normal  No respiratory distress  Breath sounds: Normal breath sounds  No wheezing  Musculoskeletal:      Cervical back: Normal range of motion  Right lower leg: No edema  Left lower leg: No edema  Lymphadenopathy:      Cervical: No cervical adenopathy  Skin:     General: Skin is warm and dry  Comments: Alopecia totalis   Neurological:      Mental Status: He is alert  Mental status is at baseline  Sensory: No sensory deficit  Psychiatric:         Mood and Affect: Mood normal          Behavior: Behavior normal          Thought Content: Thought content normal          The history was obtained from the review of the chart, patient  Lab Results:   Lab Results   Component Value Date/Time    Free t4 0 97 05/22/2023 08:18 AM         Portions of the record may have been created with voice recognition software  Occasional wrong word or \"sound a like\" substitutions may have occurred due to the inherent limitations of voice recognition software  Read the chart carefully and recognize, using context, where substitutions have occurred    "

## 2023-11-06 DIAGNOSIS — E06.3 HYPOTHYROIDISM DUE TO HASHIMOTO'S THYROIDITIS: Primary | ICD-10-CM

## 2023-11-06 DIAGNOSIS — E03.8 HYPOTHYROIDISM DUE TO HASHIMOTO'S THYROIDITIS: Primary | ICD-10-CM

## 2023-11-07 RX ORDER — LEVOTHYROXINE SODIUM 0.05 MG/1
TABLET ORAL
Qty: 18 TABLET | Refills: 2 | Status: SHIPPED | OUTPATIENT
Start: 2023-11-07

## 2023-11-07 RX ORDER — LEVOTHYROXINE SODIUM 0.07 MG/1
TABLET ORAL
Qty: 14 TABLET | Refills: 2 | Status: SHIPPED | OUTPATIENT
Start: 2023-11-07

## 2023-12-12 LAB
T4 FREE SERPL-MCNC: 0.95 NG/DL (ref 0.82–1.77)
TSH SERPL DL<=0.005 MIU/L-ACNC: 2.2 UIU/ML (ref 0.45–4.5)

## 2024-01-24 ENCOUNTER — OFFICE VISIT (OUTPATIENT)
Dept: ENDOCRINOLOGY | Facility: HOSPITAL | Age: 39
End: 2024-01-24
Payer: COMMERCIAL

## 2024-01-24 VITALS
HEART RATE: 74 BPM | SYSTOLIC BLOOD PRESSURE: 124 MMHG | BODY MASS INDEX: 29.92 KG/M2 | WEIGHT: 162.6 LBS | HEIGHT: 62 IN | DIASTOLIC BLOOD PRESSURE: 80 MMHG

## 2024-01-24 DIAGNOSIS — E03.8 HYPOTHYROIDISM DUE TO HASHIMOTO'S THYROIDITIS: Primary | ICD-10-CM

## 2024-01-24 DIAGNOSIS — E06.3 HYPOTHYROIDISM DUE TO HASHIMOTO'S THYROIDITIS: Primary | ICD-10-CM

## 2024-01-24 PROCEDURE — 99213 OFFICE O/P EST LOW 20 MIN: CPT | Performed by: PHYSICIAN ASSISTANT

## 2024-01-24 NOTE — PATIENT INSTRUCTIONS
Thyroid lab work is stable.  Continue with levothyroxine 50 mcg Tuesday, Thursday, Saturday and Sunday.  Continue with levothyroxine 75 mcg Monday, Wednesday and Friday.     Get lab work completed prior to next office visit, and follow-up in 6 months.     Call the office if there are any changes in symptoms.

## 2024-01-24 NOTE — PROGRESS NOTES
Beau Gillette 38 y.o. male MRN: [id number]    Encounter: [id number]      Assessment/Plan     Assessment:  This is a 38 y.o.-year-old male with thyroidism due to Hashimoto's thyroiditis.    Plan:  Hypothyroidism due to Hashimoto's thyroiditis: Thyroid lab work was normal.  No change in symptoms since last office visit. At this time he will continue with levothyroxine 50 mcg 4 days a week and levothyroxine 75 mcg 3 days a week.  Asked caretaker to contact the office if there is any concerns or change in symptoms.  At this time he will follow-up in 6 months with lab work completed prior to visit.     CC: Hypothyroidism follow-up    History of Present Illness     HPI:  Beau Gillette is a 38 y.o. male with hypothyroidism due to Hashimoto's thyroiditis here for follow-up with caretaker.  Has intellectual disability, and has little communication skills.  He had a lot of blood work fluctuations in 2017 with dosage adjustments in his thyroid hormone and he was brought to an endocrinologist for evaluation at that time.  His thyroid hormone dosage was set and he has had slow various adjustments over the years since then but only minor changes.  He has been on the same dose for several years at this time.  He does have a history of alopecia totalis.     He is currently taking levothyroxine 50 mcg 1 tablet 4 days a week on Sunday, Tuesday, Thursday, and Saturday and levothyroxine 75 mcg 1 tablet 3 days a week on Monday, Wednesday, and Friday.  Has gained 3 lb since last office visit.  He is taking medication appropriately.  He is walking as a form of physical activity, and caretaker has not noticed any fatigue.  Appears to have enough energy.  Has had some anger outbursts recently.  He denies insomnia, tremors, palpitation, heat or cold intolerance.  He has no brittle nails.  He has no hair growth back from his alopecia.  No change in bowel habits with diarrhea or constipation.  He denies diplopia.  He denies  compressive thyroid symptoms or difficulties with swallowing.  Overall doing well today.  No concerns or questions today.    Review of Systems   Reason unable to perform ROS: Mostly answered by Caretaker.   Constitutional:  Negative for activity change, appetite change, chills, diaphoresis, fatigue and unexpected weight change.   HENT:  Negative for sore throat, trouble swallowing and voice change.    Eyes:  Negative for photophobia, discharge, redness, itching and visual disturbance.   Respiratory:  Negative for shortness of breath.    Cardiovascular:  Negative for palpitations and leg swelling.   Gastrointestinal:  Negative for abdominal pain, constipation and diarrhea.   Endocrine: Negative for cold intolerance, heat intolerance, polydipsia, polyphagia and polyuria.   Skin:  Negative for rash.        Alopecia totalis   Neurological:  Negative for dizziness, tremors, weakness, light-headedness, numbness and headaches.   Hematological:  Negative for adenopathy.   Psychiatric/Behavioral:  Negative for dysphoric mood and sleep disturbance. The patient is not nervous/anxious.        Historical Information   Past Medical History:   Diagnosis Date   • Alopecia areata totalis    • COPD (chronic obstructive pulmonary disease) ([facility])    • Down syndrome    • Heart murmur    • Hypertriglyceridemia    • Mood disorder ([facility])    • Pneumothorax     as a child   • Raynaud's phenomenon    • Sensorineural hearing loss    • VSD (ventricular septal defect)      Past Surgical History:   Procedure Laterality Date   • CRICOID SPLIT     • MYRINGOTOMY W/ TUBES Bilateral    • TONSILLECTOMY       Social History   Social History     Substance and Sexual Activity   Alcohol Use No     Social History     Substance and Sexual Activity   Drug Use No     Social History     Tobacco Use   Smoking Status Never   Smokeless Tobacco Never     Family History:   Family History   Problem Relation Age of Onset   • Sleep apnea Mother    • Heart attack Mother   "  • Hyperlipidemia Mother    • Raynaud syndrome Mother    • No Known Problems Father    • No Known Problems Sister    • No Known Problems Brother    • Diabetes type II Family    • Parkinsonism Family        Meds/Allergies   Current Outpatient Medications   Medication Sig Dispense Refill   • amLODIPine (NORVASC) 5 mg tablet Take 5 mg by mouth daily     • Bioflavonoid Products (CHING-C PO) Take by mouth     • clobetasol (TEMOVATE) 0.05 % ointment Apply topically 2 (two) times a day     • levothyroxine 50 mcg tablet Take 1 tablet by mouth 4 days a week at 7:00 am ( sunday tues thursday saturday ) for hypothyroidism **take on an empty stomach** 18 tablet 2   • levothyroxine 75 mcg tablet Take 1 tablet by mouth three times a week at 7:00 am ( monday wednesday friday ) for hypothyroidism *take on an empty stomach* 14 tablet 2   • magnesium hydroxide (MILK OF MAGNESIA) 400 mg/5 mL oral suspension Take by mouth daily as needed for constipation     • Multiple Vitamin (Daily-Felice) TABS      • Omega-3 Fatty Acids (FISH OIL) 1,000 mg Take 1,000 mg by mouth daily     • OXcarbazepine (TRILEPTAL) 600 mg tablet Take 600 mg by mouth every 12 (twelve) hours       • QUEtiapine (SEROquel) 100 mg tablet Take 100 mg by mouth 2 (two) times a day At 2 pm and 8 pm      • QUEtiapine (SEROquel) 50 mg tablet Take 50 mg by mouth daily in the early morning         No current facility-administered medications for this visit.     Allergies   Allergen Reactions   • Debrox [Carbamide Peroxide]    • Latex    • Morphine        Objective   Vitals: Blood pressure 124/80, pulse 74, height 5' 2\" (1.575 m), weight 73.8 kg (162 lb 9.6 oz).    Physical Exam  Vitals and nursing note reviewed.   Constitutional:       General: He is not in acute distress.     Appearance: Normal appearance. He is not diaphoretic.   HENT:      Head: Normocephalic and atraumatic.   Eyes:      General: No scleral icterus.     Extraocular Movements: Extraocular movements intact.     " " Conjunctiva/sclera: Conjunctivae normal.      Pupils: Pupils are equal, round, and reactive to light.   Cardiovascular:      Rate and Rhythm: Normal rate and regular rhythm.      Heart sounds: No murmur heard.  Pulmonary:      Effort: Pulmonary effort is normal. No respiratory distress.      Breath sounds: Normal breath sounds. No wheezing.   Musculoskeletal:      Cervical back: Normal range of motion.   Lymphadenopathy:      Cervical: No cervical adenopathy.   Skin:     General: Skin is warm and dry.      Comments: Alopecia totalis   Neurological:      Mental Status: He is alert. Mental status is at baseline.   Psychiatric:         Mood and Affect: Mood normal.         Behavior: Behavior normal.         The history was obtained from the review of the chart, patient.    Lab Results:   Lab Results   Component Value Date/Time    Free t4 0.95 12/11/2023 10:21 AM    Free t4 0.97 05/22/2023 08:18 AM         Portions of the record may have been created with voice recognition software. Occasional wrong word or \"sound a like\" substitutions may have occurred due to the inherent limitations of voice recognition software. Read the chart carefully and recognize, using context, where substitutions have occurred.    "

## 2024-02-19 ENCOUNTER — TELEPHONE (OUTPATIENT)
Dept: ENDOCRINOLOGY | Facility: HOSPITAL | Age: 39
End: 2024-02-19

## 2024-02-19 NOTE — TELEPHONE ENCOUNTER
Trish from [facility] called concerning the patient's Levothyroxine prescription.    When I attempted to call back I had to leave a voicemail for her to call us back

## 2024-02-20 NOTE — TELEPHONE ENCOUNTER
Facility called back. Patient normally takes Levothyroxine 50 mcg Tuesday, Thursday, Saturday and Sunday, takes Levothyroxine 75 mcg Monday Wednesday and Friday. Patient was out with family over the weekend and took Levothyroxine 50 mcg on Saturday and Levothyroxine 75 mcg on Sunday. Patient then received Levothyroxine 50 mcg on Monday. They will resume his normal regimen going forward.

## 2024-07-24 ENCOUNTER — OFFICE VISIT (OUTPATIENT)
Dept: ENDOCRINOLOGY | Facility: HOSPITAL | Age: 39
End: 2024-07-24
Payer: COMMERCIAL

## 2024-07-24 VITALS
BODY MASS INDEX: 29.37 KG/M2 | WEIGHT: 159.6 LBS | DIASTOLIC BLOOD PRESSURE: 80 MMHG | HEART RATE: 69 BPM | HEIGHT: 62 IN | SYSTOLIC BLOOD PRESSURE: 130 MMHG

## 2024-07-24 DIAGNOSIS — E03.8 HYPOTHYROIDISM DUE TO HASHIMOTO'S THYROIDITIS: Primary | ICD-10-CM

## 2024-07-24 DIAGNOSIS — E06.3 HYPOTHYROIDISM DUE TO HASHIMOTO'S THYROIDITIS: Primary | ICD-10-CM

## 2024-07-24 PROCEDURE — 99214 OFFICE O/P EST MOD 30 MIN: CPT | Performed by: PHYSICIAN ASSISTANT

## 2024-07-24 NOTE — PROGRESS NOTES
Beau Gillette 38 y.o. male MRN: [id number]    Encounter: [id number]      Assessment & Plan     Assessment:  This is a 38 y.o.-year-old male with hypothyroidism due to Hashimoto's thyroiditis.    Plan:  Hypothyroidism due to Hashimoto's thyroiditis: Unfortunately no lab work was completed prior to today's office visit.  Since last office visit he has been less agitated, and has had more fatigue.  She was the importance of getting lab work completed to see if we need to make an adjustment to his thyroid medication.  At this time he will continue with levothyroxine 50 mcg 4 days a week and levothyroxine 75 mcg 3 days a week.  Asked caretaker to contact the office if there is any concerns or change in symptoms.  At this time he will follow-up in 6 months with lab work completed prior to visit.      CC: Hypothyroidism follow-up    History of Present Illness     HPI:  Beau Gillette is a 38 y.o. male with hypothyroidism due to Hashimoto's thyroiditis here for follow-up with caretaker.  Has intellectual disability, and has little communication skills.  He had a lot of blood work fluctuations in 2017 with dosage adjustments in his thyroid hormone and he was brought to an endocrinologist for evaluation at that time.  His thyroid hormone dosage was set and he has had slow various adjustments over the years since then but only minor changes.  He has been on the same dose for several years at this time.  He does have a history of alopecia totalis.     He is currently taking levothyroxine 50 mcg 1 tablet 4 days a week on Sunday, Tuesday, Thursday, and Saturday and levothyroxine 75 mcg 1 tablet 3 days a week on Monday, Wednesday, and Friday.  Has gained 3 lb since last office visit.  He is taking medication appropriately.  He is walking as a form of physical activity, and caretaker has not noticed any fatigue.  Appears to have enough energy.  His psychiatric medications were adjusted recently.  Anger outbursts have  decreased, but recently has been more fatigued. He denies insomnia, tremors, palpitation, heat or cold intolerance.  He has no brittle nails.  He has no hair growth back from his alopecia.  No change in bowel habits with diarrhea or constipation.  He denies diplopia.  He denies compressive thyroid symptoms or difficulties with swallowing.  Besides the fatigue otherwise is doing well today.      Review of Systems   Reason unable to perform ROS: Mostly answered by Caretaker.   Constitutional:  Negative for activity change, appetite change, chills, diaphoresis, fatigue and unexpected weight change.   HENT:  Negative for sore throat, trouble swallowing and voice change.    Eyes:  Negative for photophobia, discharge, redness, itching and visual disturbance.   Respiratory:  Negative for shortness of breath.    Cardiovascular:  Negative for palpitations and leg swelling.   Gastrointestinal:  Negative for abdominal pain, constipation and diarrhea.   Endocrine: Negative for cold intolerance, heat intolerance, polydipsia, polyphagia and polyuria.   Skin:  Negative for rash.        Alopecia totalis   Neurological:  Negative for dizziness, tremors, weakness, light-headedness, numbness and headaches.   Hematological:  Negative for adenopathy.   Psychiatric/Behavioral:  Negative for dysphoric mood and sleep disturbance. The patient is not nervous/anxious.        Historical Information   Past Medical History:   Diagnosis Date   • Alopecia areata totalis    • COPD (chronic obstructive pulmonary disease) ([facility])    • Down syndrome    • Heart murmur    • Hypertriglyceridemia    • Mood disorder ([facility])    • Pneumothorax     as a child   • Raynaud's phenomenon    • Sensorineural hearing loss    • VSD (ventricular septal defect)      Past Surgical History:   Procedure Laterality Date   • CRICOID SPLIT     • MYRINGOTOMY W/ TUBES Bilateral    • TONSILLECTOMY       Social History   Social History     Substance and Sexual Activity   Alcohol Use  No     Social History     Substance and Sexual Activity   Drug Use No     Social History     Tobacco Use   Smoking Status Never   Smokeless Tobacco Never     Family History:   Family History   Problem Relation Age of Onset   • Sleep apnea Mother    • Heart attack Mother    • Hyperlipidemia Mother    • Raynaud syndrome Mother    • No Known Problems Father    • No Known Problems Sister    • No Known Problems Brother    • Diabetes type II Family    • Parkinsonism Family        Meds/Allergies   Current Outpatient Medications   Medication Sig Dispense Refill   • amLODIPine (NORVASC) 5 mg tablet Take 5 mg by mouth daily     • Bioflavonoid Products (CHING C PO) Take 500 mg by mouth in the morning     • Bioflavonoid Products (CHING-C PO) Take by mouth     • clobetasol (TEMOVATE) 0.05 % ointment Apply topically 2 (two) times a day     • Humidifier MISC as directed     • levothyroxine 50 mcg tablet Take 1 tablet by mouth 4 days a week at 7:00 am ( sunday tues thursday saturday ) for hypothyroidism **take on an empty stomach** 18 tablet 2   • levothyroxine 75 mcg tablet Take 1 tablet by mouth three times a week at 7:00 am ( monday wednesday friday ) for hypothyroidism *take on an empty stomach* 14 tablet 2   • magnesium hydroxide (MILK OF MAGNESIA) 400 mg/5 mL oral suspension Take by mouth daily as needed for constipation     • Multiple Vitamin (Daily-Felice) TABS      • Omega-3 Fatty Acids (FISH OIL) 1,000 mg Take 1,000 mg by mouth daily     • OXcarbazepine (TRILEPTAL) 600 mg tablet Take 600 mg by mouth every 12 (twelve) hours       • QUEtiapine (SEROquel) 100 mg tablet Take 100 mg by mouth 2 (two) times a day At 2 pm and 8 pm      • QUEtiapine (SEROquel) 50 mg tablet Take 50 mg by mouth daily in the early morning       • Sodium Fluoride 1.1 % PSTE as directed Dental       No current facility-administered medications for this visit.     Allergies   Allergen Reactions   • Debrox [Carbamide Peroxide]    • Latex    • Morphine   "      Objective   Vitals: Blood pressure 130/80, pulse 69, height 5' 2\" (1.575 m), weight 72.4 kg (159 lb 9.6 oz).    Physical Exam  Vitals and nursing note reviewed.   Constitutional:       General: He is not in acute distress.     Appearance: Normal appearance. He is not diaphoretic.   HENT:      Head: Normocephalic and atraumatic.   Eyes:      General: No scleral icterus.     Extraocular Movements: Extraocular movements intact.      Conjunctiva/sclera: Conjunctivae normal.      Pupils: Pupils are equal, round, and reactive to light.   Cardiovascular:      Rate and Rhythm: Normal rate and regular rhythm.      Heart sounds: No murmur heard.  Pulmonary:      Effort: Pulmonary effort is normal. No respiratory distress.      Breath sounds: Normal breath sounds. No wheezing.   Musculoskeletal:      Cervical back: Normal range of motion.      Right lower leg: No edema.      Left lower leg: No edema.   Lymphadenopathy:      Cervical: No cervical adenopathy.   Skin:     General: Skin is warm and dry.   Neurological:      Mental Status: He is alert and oriented to person, place, and time. Mental status is at baseline.      Sensory: No sensory deficit.      Gait: Gait normal.   Psychiatric:         Mood and Affect: Mood normal.         Behavior: Behavior normal.         Thought Content: Thought content normal.         The history was obtained from the review of the chart, patient.    Lab Results:   Lab Results   Component Value Date/Time    Free t4 0.95 12/11/2023 10:21 AM       Portions of the record may have been created with voice recognition software. Occasional wrong word or \"sound a like\" substitutions may have occurred due to the inherent limitations of voice recognition software. Read the chart carefully and recognize, using context, where substitutions have occurred.    "

## 2024-07-28 LAB
T4 FREE SERPL-MCNC: 0.87 NG/DL (ref 0.82–1.77)
TSH SERPL DL<=0.005 MIU/L-ACNC: 2.39 UIU/ML (ref 0.45–4.5)

## 2024-07-29 ENCOUNTER — TELEPHONE (OUTPATIENT)
Dept: ENDOCRINOLOGY | Facility: HOSPITAL | Age: 39
End: 2024-07-29

## 2024-07-29 NOTE — TELEPHONE ENCOUNTER
----- Message from Rey Beckman PA-C sent at 7/29/2024  7:22 AM EDT -----  Recent thyroid lab work was normal.  Continue with same dose of thyroid medication.  There is any change in symptoms please contact the office.  Otherwise make sure to get lab work completed prior to next office visit.

## 2024-07-29 NOTE — TELEPHONE ENCOUNTER
I was able to copy the review and recommendations from the provider concerning the patient on a physician's order and send it to the patient's facility via email.

## 2025-01-19 LAB
T4 FREE SERPL-MCNC: 0.65 NG/DL (ref 0.82–1.77)
TSH SERPL DL<=0.005 MIU/L-ACNC: 3.63 UIU/ML (ref 0.45–4.5)

## 2025-01-22 ENCOUNTER — RESULTS FOLLOW-UP (OUTPATIENT)
Dept: ENDOCRINOLOGY | Facility: HOSPITAL | Age: 40
End: 2025-01-22

## 2025-03-27 ENCOUNTER — TELEPHONE (OUTPATIENT)
Age: 40
End: 2025-03-27

## 2025-03-27 NOTE — TELEPHONE ENCOUNTER
[facility] called  they want to know will patient  need labs  prior to his appointment .If so please  [phone]. He had some done in January.

## 2025-03-28 DIAGNOSIS — E06.3 HYPOTHYROIDISM DUE TO HASHIMOTO'S THYROIDITIS: Primary | ICD-10-CM

## 2025-04-20 LAB
T4 FREE SERPL-MCNC: 0.72 NG/DL (ref 0.82–1.77)
TSH SERPL DL<=0.005 MIU/L-ACNC: 5.29 UIU/ML (ref 0.45–4.5)

## 2025-04-21 ENCOUNTER — TELEPHONE (OUTPATIENT)
Dept: ENDOCRINOLOGY | Facility: HOSPITAL | Age: 40
End: 2025-04-21

## 2025-04-21 ENCOUNTER — RESULTS FOLLOW-UP (OUTPATIENT)
Dept: ENDOCRINOLOGY | Facility: HOSPITAL | Age: 40
End: 2025-04-21

## 2025-04-21 NOTE — TELEPHONE ENCOUNTER
[facility] sent over lab results for you to review.  They are on your desk.    Please review and advise.

## 2025-05-05 ENCOUNTER — OFFICE VISIT (OUTPATIENT)
Dept: ENDOCRINOLOGY | Facility: HOSPITAL | Age: 40
End: 2025-05-05
Payer: COMMERCIAL

## 2025-05-05 VITALS
WEIGHT: 175.8 LBS | DIASTOLIC BLOOD PRESSURE: 80 MMHG | HEIGHT: 62 IN | SYSTOLIC BLOOD PRESSURE: 126 MMHG | OXYGEN SATURATION: 98 % | HEART RATE: 72 BPM | BODY MASS INDEX: 32.35 KG/M2

## 2025-05-05 DIAGNOSIS — E06.3 HYPOTHYROIDISM DUE TO HASHIMOTO'S THYROIDITIS: Primary | ICD-10-CM

## 2025-05-05 PROCEDURE — 99213 OFFICE O/P EST LOW 20 MIN: CPT | Performed by: PHYSICIAN ASSISTANT

## 2025-05-05 RX ORDER — LEVOTHYROXINE SODIUM 75 UG/1
TABLET ORAL
Qty: 14 TABLET | Refills: 2 | Status: SHIPPED | OUTPATIENT
Start: 2025-05-05

## 2025-05-05 RX ORDER — LEVOTHYROXINE SODIUM 50 UG/1
TABLET ORAL
Qty: 18 TABLET | Refills: 2 | Status: SHIPPED | OUTPATIENT
Start: 2025-05-05

## 2025-05-05 RX ORDER — QUETIAPINE FUMARATE 25 MG/1
TABLET, FILM COATED ORAL
COMMUNITY
Start: 2025-04-24

## 2025-05-05 NOTE — PROGRESS NOTES
Name: Beau Gillette      : 1985      MRN: [id number]  Encounter Provider: Rey Beckman PA-C  Encounter Date: 2025   Encounter department: [facility]    No chief complaint on file.  :  Assessment & Plan  Hypothyroidism due to Hashimoto's thyroiditis  Recent thyroid lab work came back with an elevated TSH and a low T4.  At this time I would like to increase his levothyroxine to 75 mcg 5 days a week and 50 mcg 2 days a week.  Will recheck thyroid lab work in about 6 weeks.  Contact the office with any concerns or questions.  Follow-up in 6 months with labwork completed prior to visit.  Orders:  •  TSH, 3rd generation; Future  •  T4, free; Future  •  TSH, 3rd generation; Future  •  T4, free; Future  •  levothyroxine 50 mcg tablet; Take 1 tablet by mouth 2 days a week at 7:00 am (   ) for hypothyroidism **take on an empty stomach**  •  levothyroxine 75 mcg tablet; Take 1 tablet by mounth 5 days a week (Mon, Tues, Wed, Fri, Sat) at 7:00 am        History of Present Illness     Beau Gillette is a 39 y.o. male with hypothyroidism due to Hashimoto's thyroiditis here for follow-up with caretaker.  Has intellectual disability, and has little communication skills.  He had a lot of blood work fluctuations in 2017 with dosage adjustments in his thyroid hormone and he was brought to an endocrinologist for evaluation at that time.  His thyroid hormone dosage was set and he has had slow various adjustments over the years since then but only minor changes.  He has been on the same dose for several years at this time.  He does have a history of alopecia totalis.     He is currently taking levothyroxine 50 mcg 1 tablet 4 days a week on , Tuesday, Thursday, and Saturday and levothyroxine 75 mcg 1 tablet 3 days a week on Monday, Wednesday, and Friday.  Has gained 16 lb since last office visit.  Caretaker that usually with him was out  on medical leave for 3 months, and she states that she is the only one that he typically will walk with and this could be one of the reasons for the weight gain.  He is taking medication appropriately.  He is walking as a form of physical activity, and caretaker has not noticed any fatigue.  Appears to have enough energy.  Anger outbursts have decreased, but recently has been more fatigued. He denies insomnia, tremors, palpitation, heat or cold intolerance.  He has no brittle nails.  He has no hair growth back from his alopecia.  No change in bowel habits with diarrhea or constipation.  He denies diplopia.  He denies compressive thyroid symptoms or difficulties with swallowing.  Besides the fatigue and weight gain, otherwise is doing well today.        Review of Systems   Reason unable to perform ROS: Mostly answered by Caretaker.   Constitutional:  Negative for activity change, appetite change, chills, diaphoresis, fatigue and unexpected weight change.   HENT:  Negative for sore throat, trouble swallowing and voice change.    Eyes:  Negative for photophobia, discharge, redness, itching and visual disturbance.   Respiratory:  Negative for shortness of breath.    Cardiovascular:  Negative for palpitations and leg swelling.   Gastrointestinal:  Negative for abdominal pain, constipation and diarrhea.   Endocrine: Negative for cold intolerance, heat intolerance, polydipsia, polyphagia and polyuria.   Skin:  Negative for rash.        Alopecia totalis   Neurological:  Negative for dizziness, tremors, weakness, light-headedness, numbness and headaches.   Hematological:  Negative for adenopathy.   Psychiatric/Behavioral:  Negative for dysphoric mood and sleep disturbance. The patient is not nervous/anxious.     as per HPI  Medical History Reviewed by provider this encounter:     .  Current Outpatient Medications on File Prior to Visit   Medication Sig Dispense Refill   • amLODIPine (NORVASC) 5 mg tablet Take 5 mg by mouth  "daily     • Bioflavonoid Products (CHING C PO) Take 500 mg by mouth in the morning     • clobetasol (TEMOVATE) 0.05 % ointment Apply topically 2 (two) times a day     • Humidifier MISC as directed     • magnesium hydroxide (MILK OF MAGNESIA) 400 mg/5 mL oral suspension Take by mouth daily as needed for constipation     • Multiple Vitamin (Daily-Felice) TABS      • Omega-3 Fatty Acids (FISH OIL) 1,000 mg Take 1,000 mg by mouth daily     • OXcarbazepine (TRILEPTAL) 600 mg tablet Take 600 mg by mouth every 12 (twelve) hours       • QUEtiapine (SEROquel) 25 mg tablet      • QUEtiapine (SEROquel) 50 mg tablet Take 50 mg by mouth daily in the early morning       • Sodium Fluoride 1.1 % PSTE as directed Dental     • [DISCONTINUED] levothyroxine 50 mcg tablet Take 1 tablet by mouth 4 days a week at 7:00 am ( sunday tues thursday saturday ) for hypothyroidism **take on an empty stomach** 18 tablet 2   • [DISCONTINUED] levothyroxine 75 mcg tablet Take 1 tablet by mouth three times a week at 7:00 am ( monday wednesday friday ) for hypothyroidism *take on an empty stomach* 14 tablet 2   • Bioflavonoid Products (CHING-C PO) Take by mouth (Patient not taking: Reported on 5/5/2025)     • QUEtiapine (SEROquel) 100 mg tablet Take 100 mg by mouth 2 (two) times a day At 2 pm and 8 pm  (Patient not taking: Reported on 5/5/2025)       No current facility-administered medications on file prior to visit.      Social History     Tobacco Use   • Smoking status: Never   • Smokeless tobacco: Never   Vaping Use   • Vaping status: Never Used   Substance and Sexual Activity   • Alcohol use: No   • Drug use: No   • Sexual activity: Not on file        Medical History Reviewed by provider this encounter:     .    Objective   /80   Pulse 72   Ht 5' 2\" (1.575 m)   Wt 79.7 kg (175 lb 12.8 oz)   SpO2 98%   BMI 32.15 kg/m²      Body mass index is 32.15 kg/m².  Wt Readings from Last 3 Encounters:   05/05/25 79.7 kg (175 lb 12.8 oz)   07/24/24 " "72.4 kg (159 lb 9.6 oz)   01/24/24 73.8 kg (162 lb 9.6 oz)     Physical Exam  Vitals and nursing note reviewed.   Constitutional:       General: He is not in acute distress.     Appearance: Normal appearance. He is well-developed. He is not diaphoretic.   Eyes:      General: No scleral icterus.     Extraocular Movements: Extraocular movements intact.      Conjunctiva/sclera: Conjunctivae normal.      Pupils: Pupils are equal, round, and reactive to light.   Cardiovascular:      Rate and Rhythm: Normal rate and regular rhythm.      Pulses: Normal pulses.      Heart sounds: Normal heart sounds. No murmur heard.     No friction rub. No gallop.   Pulmonary:      Effort: Pulmonary effort is normal. No tachypnea, bradypnea or respiratory distress.      Breath sounds: Normal breath sounds. No wheezing.   Musculoskeletal:      Cervical back: Normal range of motion.      Right lower leg: No edema.      Left lower leg: No edema.   Lymphadenopathy:      Cervical: No cervical adenopathy.   Skin:     General: Skin is warm and dry.   Neurological:      Mental Status: He is alert. Mental status is at baseline. He is not disoriented.      Motor: No abnormal muscle tone.      Gait: Gait normal.      Deep Tendon Reflexes: Reflexes are normal and symmetric.   Psychiatric:         Mood and Affect: Mood normal.         Behavior: Behavior normal.         Thought Content: Thought content normal.         Labs:   Lab Results   Component Value Date    HGBA1C 5.0 08/11/2018    HGBA1C 4.9 10/28/2017     Lab Results   Component Value Date    CREATININE 0.88 06/04/2022    BUN 11 06/04/2022    K 4.5 06/04/2022    CL 93 (L) 06/04/2022    CO2 28 06/04/2022     eGFR   Date Value Ref Range Status   06/04/2022 116 >59 mL/min/1.73 Final     No results found for: \"CHOL\", \"HDL\", \"LDL\", \"TRIG\", \"CHOLHDL\"  Lab Results   Component Value Date    ALT 36 06/04/2022    AST 23 06/04/2022     No results found for: \"[id number]\"  Lab Results   Component Value " Date    FREET4 0.72 (L) 04/19/2025       Patient Instructions   Thyroid is underactive at this time.    I would like to change his prescription to levothyroxine 50 mcg on Thursday and Sunday, and levothyroxine 75 mcg on Monday, Tuesday, Wednesday, Friday, and Saturday.    Get lab work in about 6 weeks.     Get lab work completed prior to next office visit, and follow-up in 6 months.     Call the office if there are any changes in symptoms.    Discussed with the patient and all questioned fully answered. He will call me if any problems arise.

## 2025-05-05 NOTE — ASSESSMENT & PLAN NOTE
Recent thyroid lab work came back with an elevated TSH and a low T4.  At this time I would like to increase his levothyroxine to 75 mcg 5 days a week and 50 mcg 2 days a week.  Will recheck thyroid lab work in about 6 weeks.  Contact the office with any concerns or questions.  Follow-up in 6 months with labwork completed prior to visit.  Orders:  •  TSH, 3rd generation; Future  •  T4, free; Future  •  TSH, 3rd generation; Future  •  T4, free; Future  •  levothyroxine 50 mcg tablet; Take 1 tablet by mouth 2 days a week at 7:00 am ( sunday thursday  ) for hypothyroidism **take on an empty stomach**  •  levothyroxine 75 mcg tablet; Take 1 tablet by mounth 5 days a week (Mon, Tues, Wed, Fri, Sat) at 7:00 am

## 2025-05-05 NOTE — PATIENT INSTRUCTIONS
Thyroid is underactive at this time.    I would like to change his prescription to levothyroxine 50 mcg on Thursday and Sunday, and levothyroxine 75 mcg on Monday, Tuesday, Wednesday, Friday, and Saturday.    Get lab work in about 6 weeks.     Get lab work completed prior to next office visit, and follow-up in 6 months.     Call the office if there are any changes in symptoms.

## 2025-06-19 DIAGNOSIS — E06.3 HYPOTHYROIDISM DUE TO HASHIMOTO'S THYROIDITIS: ICD-10-CM

## 2025-06-19 RX ORDER — LEVOTHYROXINE SODIUM 75 UG/1
TABLET ORAL
Qty: 20 TABLET | Refills: 11 | Status: SHIPPED | OUTPATIENT
Start: 2025-06-19